# Patient Record
Sex: MALE | Race: WHITE | NOT HISPANIC OR LATINO | URBAN - METROPOLITAN AREA
[De-identification: names, ages, dates, MRNs, and addresses within clinical notes are randomized per-mention and may not be internally consistent; named-entity substitution may affect disease eponyms.]

---

## 2023-09-15 ENCOUNTER — INPATIENT (INPATIENT)
Facility: HOSPITAL | Age: 68
LOS: 1 days | DRG: 871 | End: 2023-09-17
Attending: INTERNAL MEDICINE | Admitting: INTERNAL MEDICINE
Payer: MEDICARE

## 2023-09-15 VITALS
WEIGHT: 244.05 LBS | SYSTOLIC BLOOD PRESSURE: 92 MMHG | HEART RATE: 49 BPM | OXYGEN SATURATION: 99 % | TEMPERATURE: 98 F | DIASTOLIC BLOOD PRESSURE: 48 MMHG | RESPIRATION RATE: 17 BRPM

## 2023-09-15 DIAGNOSIS — R00.1 BRADYCARDIA, UNSPECIFIED: ICD-10-CM

## 2023-09-15 DIAGNOSIS — I50.9 HEART FAILURE, UNSPECIFIED: ICD-10-CM

## 2023-09-15 DIAGNOSIS — E87.5 HYPERKALEMIA: ICD-10-CM

## 2023-09-15 DIAGNOSIS — N17.9 ACUTE KIDNEY FAILURE, UNSPECIFIED: ICD-10-CM

## 2023-09-15 DIAGNOSIS — I95.9 HYPOTENSION, UNSPECIFIED: ICD-10-CM

## 2023-09-15 DIAGNOSIS — Z79.899 OTHER LONG TERM (CURRENT) DRUG THERAPY: ICD-10-CM

## 2023-09-15 PROBLEM — Z00.00 ENCOUNTER FOR PREVENTIVE HEALTH EXAMINATION: Status: ACTIVE | Noted: 2023-09-15

## 2023-09-15 LAB
ALBUMIN SERPL ELPH-MCNC: 2 G/DL — LOW (ref 3.3–5.2)
ALP SERPL-CCNC: 85 U/L — SIGNIFICANT CHANGE UP (ref 40–120)
ALT FLD-CCNC: 33 U/L — SIGNIFICANT CHANGE UP
ANION GAP SERPL CALC-SCNC: 16 MMOL/L — SIGNIFICANT CHANGE UP (ref 5–17)
ANION GAP SERPL CALC-SCNC: 18 MMOL/L — HIGH (ref 5–17)
APPEARANCE UR: CLEAR — SIGNIFICANT CHANGE UP
APTT BLD: 41.1 SEC — HIGH (ref 24.5–35.6)
AST SERPL-CCNC: 87 U/L — HIGH
BACTERIA # UR AUTO: ABNORMAL
BASE EXCESS BLDV CALC-SCNC: -12.3 MMOL/L — LOW (ref -2–3)
BASOPHILS # BLD AUTO: 0.03 K/UL — SIGNIFICANT CHANGE UP (ref 0–0.2)
BASOPHILS NFR BLD AUTO: 0.5 % — SIGNIFICANT CHANGE UP (ref 0–2)
BILIRUB SERPL-MCNC: 3.4 MG/DL — HIGH (ref 0.4–2)
BILIRUB UR-MCNC: ABNORMAL
BUN SERPL-MCNC: 60.6 MG/DL — HIGH (ref 8–20)
BUN SERPL-MCNC: 61.2 MG/DL — HIGH (ref 8–20)
CA-I SERPL-SCNC: 1.02 MMOL/L — LOW (ref 1.15–1.33)
CALCIUM SERPL-MCNC: 8.2 MG/DL — LOW (ref 8.4–10.5)
CALCIUM SERPL-MCNC: 8.3 MG/DL — LOW (ref 8.4–10.5)
CHLORIDE BLDV-SCNC: 105 MMOL/L — SIGNIFICANT CHANGE UP (ref 96–108)
CHLORIDE SERPL-SCNC: 101 MMOL/L — SIGNIFICANT CHANGE UP (ref 96–108)
CHLORIDE SERPL-SCNC: 102 MMOL/L — SIGNIFICANT CHANGE UP (ref 96–108)
CO2 SERPL-SCNC: 13 MMOL/L — LOW (ref 22–29)
CO2 SERPL-SCNC: 14 MMOL/L — LOW (ref 22–29)
COLOR SPEC: YELLOW — SIGNIFICANT CHANGE UP
CREAT SERPL-MCNC: 5.05 MG/DL — HIGH (ref 0.5–1.3)
CREAT SERPL-MCNC: 5.3 MG/DL — HIGH (ref 0.5–1.3)
DIFF PNL FLD: ABNORMAL
EGFR: 11 ML/MIN/1.73M2 — LOW
EGFR: 12 ML/MIN/1.73M2 — LOW
EOSINOPHIL # BLD AUTO: 0.12 K/UL — SIGNIFICANT CHANGE UP (ref 0–0.5)
EOSINOPHIL NFR BLD AUTO: 1.8 % — SIGNIFICANT CHANGE UP (ref 0–6)
EPI CELLS # UR: SIGNIFICANT CHANGE UP
GAS PNL BLDV: 127 MMOL/L — LOW (ref 136–145)
GAS PNL BLDV: SIGNIFICANT CHANGE UP
GLUCOSE BLDV-MCNC: 79 MG/DL — SIGNIFICANT CHANGE UP (ref 70–99)
GLUCOSE SERPL-MCNC: 83 MG/DL — SIGNIFICANT CHANGE UP (ref 70–99)
GLUCOSE SERPL-MCNC: 86 MG/DL — SIGNIFICANT CHANGE UP (ref 70–99)
GLUCOSE UR QL: NEGATIVE — SIGNIFICANT CHANGE UP
HCO3 BLDV-SCNC: 15 MMOL/L — LOW (ref 22–29)
HCT VFR BLD CALC: 27.2 % — LOW (ref 39–50)
HCT VFR BLDA CALC: 28 % — SIGNIFICANT CHANGE UP
HGB BLD CALC-MCNC: 9.2 G/DL — LOW (ref 12.6–17.4)
HGB BLD-MCNC: 10 G/DL — LOW (ref 13–17)
IMM GRANULOCYTES NFR BLD AUTO: 0.5 % — SIGNIFICANT CHANGE UP (ref 0–0.9)
INR BLD: 1.77 RATIO — HIGH (ref 0.85–1.18)
KETONES UR-MCNC: ABNORMAL
LACTATE BLDV-MCNC: 3.1 MMOL/L — HIGH (ref 0.5–2)
LEUKOCYTE ESTERASE UR-ACNC: ABNORMAL
LYMPHOCYTES # BLD AUTO: 0.96 K/UL — LOW (ref 1–3.3)
LYMPHOCYTES # BLD AUTO: 14.7 % — SIGNIFICANT CHANGE UP (ref 13–44)
MAGNESIUM SERPL-MCNC: 2.4 MG/DL — SIGNIFICANT CHANGE UP (ref 1.8–2.6)
MCHC RBC-ENTMCNC: 33.2 PG — SIGNIFICANT CHANGE UP (ref 27–34)
MCHC RBC-ENTMCNC: 36.8 GM/DL — HIGH (ref 32–36)
MCV RBC AUTO: 90.4 FL — SIGNIFICANT CHANGE UP (ref 80–100)
MONOCYTES # BLD AUTO: 0.81 K/UL — SIGNIFICANT CHANGE UP (ref 0–0.9)
MONOCYTES NFR BLD AUTO: 12.4 % — SIGNIFICANT CHANGE UP (ref 2–14)
NEUTROPHILS # BLD AUTO: 4.59 K/UL — SIGNIFICANT CHANGE UP (ref 1.8–7.4)
NEUTROPHILS NFR BLD AUTO: 70.1 % — SIGNIFICANT CHANGE UP (ref 43–77)
NITRITE UR-MCNC: POSITIVE
NT-PROBNP SERPL-SCNC: 4893 PG/ML — HIGH (ref 0–300)
PCO2 BLDV: 33 MMHG — LOW (ref 42–55)
PH BLDV: 7.26 — LOW (ref 7.32–7.43)
PH UR: 5 — SIGNIFICANT CHANGE UP (ref 5–8)
PLATELET # BLD AUTO: 123 K/UL — LOW (ref 150–400)
PO2 BLDV: 110 MMHG — HIGH (ref 25–45)
POTASSIUM BLDV-SCNC: 7.2 MMOL/L — CRITICAL HIGH (ref 3.5–5.1)
POTASSIUM SERPL-MCNC: 6 MMOL/L — HIGH (ref 3.5–5.3)
POTASSIUM SERPL-MCNC: 6.4 MMOL/L — CRITICAL HIGH (ref 3.5–5.3)
POTASSIUM SERPL-SCNC: 6 MMOL/L — HIGH (ref 3.5–5.3)
POTASSIUM SERPL-SCNC: 6.4 MMOL/L — CRITICAL HIGH (ref 3.5–5.3)
PROT SERPL-MCNC: 6.4 G/DL — LOW (ref 6.6–8.7)
PROT UR-MCNC: 100
PROTHROM AB SERPL-ACNC: 19.3 SEC — HIGH (ref 9.5–13)
RBC # BLD: 3.01 M/UL — LOW (ref 4.2–5.8)
RBC # FLD: 18.3 % — HIGH (ref 10.3–14.5)
RBC CASTS # UR COMP ASSIST: ABNORMAL /HPF (ref 0–4)
SAO2 % BLDV: 99.6 % — SIGNIFICANT CHANGE UP
SODIUM SERPL-SCNC: 131 MMOL/L — LOW (ref 135–145)
SODIUM SERPL-SCNC: 133 MMOL/L — LOW (ref 135–145)
SP GR SPEC: 1.02 — SIGNIFICANT CHANGE UP (ref 1.01–1.02)
TROPONIN T SERPL-MCNC: 0.17 NG/ML — HIGH (ref 0–0.06)
UROBILINOGEN FLD QL: 4
WBC # BLD: 6.54 K/UL — SIGNIFICANT CHANGE UP (ref 3.8–10.5)
WBC # FLD AUTO: 6.54 K/UL — SIGNIFICANT CHANGE UP (ref 3.8–10.5)
WBC UR QL: SIGNIFICANT CHANGE UP /HPF (ref 0–5)

## 2023-09-15 PROCEDURE — 71045 X-RAY EXAM CHEST 1 VIEW: CPT | Mod: 26

## 2023-09-15 PROCEDURE — 70450 CT HEAD/BRAIN W/O DYE: CPT | Mod: 26,MG

## 2023-09-15 PROCEDURE — 99223 1ST HOSP IP/OBS HIGH 75: CPT

## 2023-09-15 PROCEDURE — 93010 ELECTROCARDIOGRAM REPORT: CPT

## 2023-09-15 PROCEDURE — 71250 CT THORAX DX C-: CPT | Mod: 26,MG

## 2023-09-15 PROCEDURE — 74176 CT ABD & PELVIS W/O CONTRAST: CPT | Mod: 26,ME

## 2023-09-15 PROCEDURE — 72125 CT NECK SPINE W/O DYE: CPT | Mod: 26,MG

## 2023-09-15 PROCEDURE — 99291 CRITICAL CARE FIRST HOUR: CPT

## 2023-09-15 PROCEDURE — G1004: CPT

## 2023-09-15 RX ORDER — VANCOMYCIN HCL 1 G
1000 VIAL (EA) INTRAVENOUS ONCE
Refills: 0 | Status: COMPLETED | OUTPATIENT
Start: 2023-09-15 | End: 2023-09-15

## 2023-09-15 RX ORDER — SODIUM BICARBONATE 1 MEQ/ML
0.14 SYRINGE (ML) INTRAVENOUS
Qty: 150 | Refills: 0 | Status: DISCONTINUED | OUTPATIENT
Start: 2023-09-15 | End: 2023-09-16

## 2023-09-15 RX ORDER — HEPARIN SODIUM 5000 [USP'U]/ML
5000 INJECTION INTRAVENOUS; SUBCUTANEOUS EVERY 8 HOURS
Refills: 0 | Status: DISCONTINUED | OUTPATIENT
Start: 2023-09-15 | End: 2023-09-16

## 2023-09-15 RX ORDER — CALCIUM GLUCONATE 100 MG/ML
1 VIAL (ML) INTRAVENOUS ONCE
Refills: 0 | Status: COMPLETED | OUTPATIENT
Start: 2023-09-15 | End: 2023-09-15

## 2023-09-15 RX ORDER — NOREPINEPHRINE BITARTRATE/D5W 8 MG/250ML
0.05 PLASTIC BAG, INJECTION (ML) INTRAVENOUS
Qty: 8 | Refills: 0 | Status: DISCONTINUED | OUTPATIENT
Start: 2023-09-15 | End: 2023-09-16

## 2023-09-15 RX ORDER — DEXTROSE 50 % IN WATER 50 %
50 SYRINGE (ML) INTRAVENOUS ONCE
Refills: 0 | Status: COMPLETED | OUTPATIENT
Start: 2023-09-15 | End: 2023-09-15

## 2023-09-15 RX ORDER — DOPAMINE HYDROCHLORIDE 40 MG/ML
2 INJECTION, SOLUTION, CONCENTRATE INTRAVENOUS
Qty: 400 | Refills: 0 | Status: DISCONTINUED | OUTPATIENT
Start: 2023-09-15 | End: 2023-09-16

## 2023-09-15 RX ORDER — EPINEPHRINE 0.3 MG/.3ML
0.03 INJECTION INTRAMUSCULAR; SUBCUTANEOUS
Qty: 4 | Refills: 0 | Status: DISCONTINUED | OUTPATIENT
Start: 2023-09-15 | End: 2023-09-16

## 2023-09-15 RX ORDER — NOREPINEPHRINE BITARTRATE/D5W 8 MG/250ML
0.05 PLASTIC BAG, INJECTION (ML) INTRAVENOUS
Qty: 8 | Refills: 0 | Status: DISCONTINUED | OUTPATIENT
Start: 2023-09-15 | End: 2023-09-15

## 2023-09-15 RX ORDER — PIPERACILLIN AND TAZOBACTAM 4; .5 G/20ML; G/20ML
3.38 INJECTION, POWDER, LYOPHILIZED, FOR SOLUTION INTRAVENOUS ONCE
Refills: 0 | Status: COMPLETED | OUTPATIENT
Start: 2023-09-15 | End: 2023-09-15

## 2023-09-15 RX ORDER — CHLORHEXIDINE GLUCONATE 213 G/1000ML
1 SOLUTION TOPICAL
Refills: 0 | Status: DISCONTINUED | OUTPATIENT
Start: 2023-09-15 | End: 2023-09-17

## 2023-09-15 RX ORDER — GLUCAGON INJECTION, SOLUTION 0.5 MG/.1ML
3 INJECTION, SOLUTION SUBCUTANEOUS ONCE
Refills: 0 | Status: COMPLETED | OUTPATIENT
Start: 2023-09-15 | End: 2023-09-15

## 2023-09-15 RX ORDER — ALBUMIN HUMAN 25 %
50 VIAL (ML) INTRAVENOUS ONCE
Refills: 0 | Status: COMPLETED | OUTPATIENT
Start: 2023-09-15 | End: 2023-09-15

## 2023-09-15 RX ORDER — PANTOPRAZOLE SODIUM 20 MG/1
40 TABLET, DELAYED RELEASE ORAL
Refills: 0 | Status: DISCONTINUED | OUTPATIENT
Start: 2023-09-15 | End: 2023-09-17

## 2023-09-15 RX ORDER — SODIUM CHLORIDE 9 MG/ML
500 INJECTION INTRAMUSCULAR; INTRAVENOUS; SUBCUTANEOUS ONCE
Refills: 0 | Status: COMPLETED | OUTPATIENT
Start: 2023-09-15 | End: 2023-09-15

## 2023-09-15 RX ORDER — SODIUM BICARBONATE 1 MEQ/ML
50 SYRINGE (ML) INTRAVENOUS ONCE
Refills: 0 | Status: COMPLETED | OUTPATIENT
Start: 2023-09-15 | End: 2023-09-15

## 2023-09-15 RX ORDER — ATROPINE SULFATE 0.1 MG/ML
1 SYRINGE (ML) INJECTION ONCE
Refills: 0 | Status: COMPLETED | OUTPATIENT
Start: 2023-09-15 | End: 2023-09-15

## 2023-09-15 RX ORDER — INSULIN HUMAN 100 [IU]/ML
10 INJECTION, SOLUTION SUBCUTANEOUS ONCE
Refills: 0 | Status: COMPLETED | OUTPATIENT
Start: 2023-09-15 | End: 2023-09-15

## 2023-09-15 RX ADMIN — Medication 100 GRAM(S): at 17:24

## 2023-09-15 RX ADMIN — EPINEPHRINE 12.5 MICROGRAM(S)/KG/MIN: 0.3 INJECTION INTRAMUSCULAR; SUBCUTANEOUS at 18:08

## 2023-09-15 RX ADMIN — INSULIN HUMAN 10 UNIT(S): 100 INJECTION, SOLUTION SUBCUTANEOUS at 20:47

## 2023-09-15 RX ADMIN — Medication 1 MILLIGRAM(S): at 18:04

## 2023-09-15 RX ADMIN — DOPAMINE HYDROCHLORIDE 8.3 MICROGRAM(S)/KG/MIN: 40 INJECTION, SOLUTION, CONCENTRATE INTRAVENOUS at 20:58

## 2023-09-15 RX ADMIN — GLUCAGON INJECTION, SOLUTION 3 MILLIGRAM(S): 0.5 INJECTION, SOLUTION SUBCUTANEOUS at 18:06

## 2023-09-15 RX ADMIN — Medication 50 MILLIEQUIVALENT(S): at 20:41

## 2023-09-15 RX ADMIN — Medication 250 MILLIGRAM(S): at 20:49

## 2023-09-15 RX ADMIN — SODIUM CHLORIDE 500 MILLILITER(S): 9 INJECTION INTRAMUSCULAR; INTRAVENOUS; SUBCUTANEOUS at 17:18

## 2023-09-15 RX ADMIN — PIPERACILLIN AND TAZOBACTAM 200 GRAM(S): 4; .5 INJECTION, POWDER, LYOPHILIZED, FOR SOLUTION INTRAVENOUS at 18:16

## 2023-09-15 RX ADMIN — Medication 50 MILLILITER(S): at 20:41

## 2023-09-15 NOTE — ED PROVIDER NOTE - CRITICAL CARE ATTENDING CONTRIBUTION TO CARE
68M presenting from [facility] initially in triage with stable vitals, though on bedside eval found to have minimal responsiveness with hypotension, bradycardia; required multiple consultations, multiple drips / emergent medicines, multiple family discussions and repeat evaluations 68M presenting from [facility] initially in triage with stable vitals, though on bedside eval found to have minimal responsiveness with hypotension, bradycardia; required multiple consultations, multiple drips / emergent medicines, multiple family discussions and repeat evaluations 68M presenting from [facility] initially in triage with stable vitals, though on bedside eval found to have minimal responsiveness with hypotension, bradycardia; required multiple consultations, multiple drips / emergent medicines, multiple family discussions and repeat evaluations

## 2023-09-15 NOTE — CONSULT NOTE ADULT - ASSESSMENT
69yo M w/ HTN, Hypothyroidism, Alcoholic cirrhosis, CKD stage V admitted after acute mental status changes, worsening renal failure, sinus bradycardia with prolonged QTc interval and low voltage on his EKG (on Metoprolol 100mg daily in NH).  Etiology likely due to hyperkalemia, anion gap metabolic acidosis and sepsis (Lactate 3.1).  Also patient found to be hypotensive with systolic blood pressure in the 60s started on Levophed and Dopamine gtts admitted to ICU.  Cardiology called for fluid overload likely due to underlying cardiomyopathy.  Patient w/ MOLST from NH.    67yo M w/ HTN, Hypothyroidism, Alcoholic cirrhosis, CKD stage V admitted after acute mental status changes, worsening renal failure, sinus bradycardia with prolonged QTc interval and low voltage on his EKG (on Metoprolol 100mg daily in NH).  Etiology likely due to hyperkalemia, anion gap metabolic acidosis and sepsis (Lactate 3.1).  Also patient found to be hypotensive with systolic blood pressure in the 60s started on Levophed and Dopamine gtts admitted to ICU.  Cardiology called for fluid overload likely due to underlying cardiomyopathy.  Patient w/ [facility] from NH.    67yo M w/ HTN, Hypothyroidism, Alcoholic cirrhosis, CKD stage V admitted after acute mental status changes, worsening renal failure, sinus bradycardia with prolonged QTc interval and low voltage on his EKG (on Metoprolol 100mg daily in NH).  Etiology likely due to hyperkalemia, anion gap metabolic acidosis and sepsis (Lactate 3.1).  Also patient found to be hypotensive with systolic blood pressure in the 60s started on Levophed and Dopamine gtts admitted to ICU.  Cardiology called for fluid overload, likely due to underlying cardiomyopathy.  Patient w/ [facility]ST from NH.

## 2023-09-15 NOTE — CONSULT NOTE ADULT - SUBJECTIVE AND OBJECTIVE BOX
CARDIAC ELECTROPHYSIOLOGY  [facility]   Office: [address] [address]  Telephone: [phone] Fax:[phone]      68 year old male nursing home resident h/o HTN, hypothyroid, alcoholic cirrhosis and CKD, p/w AMS and KIM. Ability to obtain history is limited by pt's poor mental status/somnolence. Per report, pt fell yesterday at NH and subsequently was noted to have increasing creatinine. NH was concerned for rhabdomylysis, prompting transport to [facility]. On arrival, pt is noted to be hypothermic, acidotic, and likely hyperkalemic (6 on BMP but sample hemolyzed w/ repeat pending; 7.2on VBG), with Cr 5.05, and sinus bradycardia to 40s bpm. EP is consulted for sinus bradycardia.     Pt is unable to participate in ROS due to lethargy. Pt is noted to be on metoprolol succinate 100mg daily.     PAST MEDICAL & SURGICAL HISTORY:  HTN  Alcoholic Cirrhosis  CKD  Gout    REVIEW OF SYSTEMS: unable to obtain due to AMS    Home medications:   Allopurinol 300mg daily  Folic Acid  Levothyroxine 88mcg daily  metoprolol succinate 100mg daily   Thiamine      MEDICATIONS  (STANDING):  norepinephrine Infusion 0.05 MICROgram(s)/kG/Min (10.4 mL/Hr) IV Continuous <Continuous>  piperacillin/tazobactam IVPB... 3.375 Gram(s) IV Intermittent once      Allergies  doxycycline (Other (Mild to Mod))    SOCIAL HISTORY: unable to obtain due to AMS    FAMILY HISTORY: unable to obtain due to AMS      Vital Signs Last 24 Hrs  T(C): 32.7 (15 Sep 2023 16:30), Max: 36.7 (15 Sep 2023 13:46)  T(F): 90.9 (15 Sep 2023 16:30), Max: 98 (15 Sep 2023 13:46)  HR: 42 (15 Sep 2023 16:30) (42 - 49)  BP: 89/42 (15 Sep 2023 16:30) (89/42 - 92/48)  RR: 20 (15 Sep 2023 16:30) (17 - 20)  SpO2: 100% (15 Sep 2023 16:30) (99% - 100%)    Parameters below as of 15 Sep 2023 13:46  Patient On (Oxygen Delivery Method): room air    Physical Exam:  Constitutional: lethargic, minimally arousable, NAD  Neck: supple  Cardiovascular: +S1S2 bradycardic, regular  Pulmonary: course breath sounds (anterior exam only)  Abdomen: +BS, softly distended  Extremities: distal upper extremities cool w/ nonpitting edema; LE wrapped b/l  Neuro: lethargic    LABS:  09-15    131<L>  |  101  |  60.6<H>  ----------------------------<  86  6.0<H>   |  14.0<L>  |  5.05<H>    Ca    8.2<L>      15 Sep 2023 16:25  Mg     2.4     09-15    TPro  6.4<L>  /  Alb  2.0<L>  /  TBili  3.4<H>  /  DBili  x   /  AST  87<H>  /  ALT  33  /  AlkPhos  85  09-15  LIVER FUNCTIONS - ( 15 Sep 2023 16:25 )  Alb: 2.0 g/dL / Pro: 6.4 g/dL / ALK PHOS: 85 U/L / ALT: 33 U/L / AST: 87 U/L / GGT: x           PT/INR - ( 15 Sep 2023 16:25 )   PT: 19.3 sec;   INR: 1.77 ratio         PTT - ( 15 Sep 2023 16:25 )  PTT:41.1 secCARDIAC MARKERS ( 15 Sep 2023 16:25 )  x     / 0.17 ng/mL / x     / x     / x          RADIOLOGY & ADDITIONAL STUDIES:  CXR, echo pending   CARDIAC ELECTROPHYSIOLOGY  [facility]   Office: [address] [address]  Telephone: [phone] Fax:[phone]      68 year old male nursing home resident h/o HTN, hypothyroid, alcoholic cirrhosis and CKD, p/w AMS and KIM. Ability to obtain history is limited by pt's poor mental status/somnolence. Per report, pt fell yesterday at NH and subsequently was noted to have increasing creatinine. NH was concerned for rhabdomylysis, prompting transport to [facility]. On arrival, pt is noted to be hypothermic, acidotic, and likely hyperkalemic (6 on BMP but sample hemolyzed w/ repeat pending; 7.2on VBG), with Cr 5.05, and sinus bradycardia to 40s bpm. EP is consulted for sinus bradycardia.     Pt is unable to participate in ROS due to lethargy. Pt is noted to be on metoprolol succinate 100mg daily.     PAST MEDICAL & SURGICAL HISTORY:  HTN  Alcoholic Cirrhosis  CKD  Gout    REVIEW OF SYSTEMS: unable to obtain due to AMS    Home medications:   Allopurinol 300mg daily  Folic Acid  Levothyroxine 88mcg daily  metoprolol succinate 100mg daily   Thiamine      MEDICATIONS  (STANDING):  norepinephrine Infusion 0.05 MICROgram(s)/kG/Min (10.4 mL/Hr) IV Continuous <Continuous>  piperacillin/tazobactam IVPB... 3.375 Gram(s) IV Intermittent once      Allergies  doxycycline (Other (Mild to Mod))    SOCIAL HISTORY: unable to obtain due to AMS    FAMILY HISTORY: unable to obtain due to AMS      Vital Signs Last 24 Hrs  T(C): 32.7 (15 Sep 2023 16:30), Max: 36.7 (15 Sep 2023 13:46)  T(F): 90.9 (15 Sep 2023 16:30), Max: 98 (15 Sep 2023 13:46)  HR: 42 (15 Sep 2023 16:30) (42 - 49)  BP: 89/42 (15 Sep 2023 16:30) (89/42 - 92/48)  RR: 20 (15 Sep 2023 16:30) (17 - 20)  SpO2: 100% (15 Sep 2023 16:30) (99% - 100%)    Parameters below as of 15 Sep 2023 13:46  Patient On (Oxygen Delivery Method): room air    Physical Exam:  Constitutional: lethargic, minimally arousable, NAD  Neck: supple  Cardiovascular: +S1S2 bradycardic, regular  Pulmonary: course breath sounds (anterior exam only)  Abdomen: +BS, softly distended  Extremities: distal upper extremities cool w/ nonpitting edema; LE wrapped b/l  Neuro: lethargic    LABS:  09-15    131<L>  |  101  |  60.6<H>  ----------------------------<  86  6.0<H>   |  14.0<L>  |  5.05<H>    Ca    8.2<L>      15 Sep 2023 16:25  Mg     2.4     09-15    TPro  6.4<L>  /  Alb  2.0<L>  /  TBili  3.4<H>  /  DBili  x   /  AST  87<H>  /  ALT  33  /  AlkPhos  85  09-15  LIVER FUNCTIONS - ( 15 Sep 2023 16:25 )  Alb: 2.0 g/dL / Pro: 6.4 g/dL / ALK PHOS: 85 U/L / ALT: 33 U/L / AST: 87 U/L / GGT: x           PT/INR - ( 15 Sep 2023 16:25 )   PT: 19.3 sec;   INR: 1.77 ratio         PTT - ( 15 Sep 2023 16:25 )  PTT:41.1 secCARDIAC MARKERS ( 15 Sep 2023 16:25 )  x     / 0.17 ng/mL / x     / x     / x          RADIOLOGY & ADDITIONAL STUDIES:  CXR, echo pending   CARDIAC ELECTROPHYSIOLOGY  [facility]   Office: [address] [address]  Telephone: [phone] Fax:[phone]      68 year old male nursing home resident h/o HTN, hypothyroid, alcoholic cirrhosis and CKD, p/w AMS and KIM. Ability to obtain history is limited by pt's poor mental status/somnolence. Per report, pt fell yesterday at NH and subsequently was noted to have increasing creatinine. NH was concerned for rhabdomylysis, prompting transport to [facility]. On arrival, pt is noted to be hypothermic, acidotic, and likely hyperkalemic (6 on BMP but sample hemolyzed w/ repeat pending; 7.2on VBG), with Cr 5.05, and sinus bradycardia to 40s bpm. EP is consulted for sinus bradycardia.     Pt is unable to participate in ROS due to lethargy. Pt is noted to be on metoprolol succinate 100mg daily.     PAST MEDICAL & SURGICAL HISTORY:  HTN  Alcoholic Cirrhosis  CKD  Gout    REVIEW OF SYSTEMS: unable to obtain due to AMS    Home medications:   Allopurinol 300mg daily  Folic Acid  Levothyroxine 88mcg daily  metoprolol succinate 100mg daily   Thiamine      MEDICATIONS  (STANDING):  norepinephrine Infusion 0.05 MICROgram(s)/kG/Min (10.4 mL/Hr) IV Continuous <Continuous>  piperacillin/tazobactam IVPB... 3.375 Gram(s) IV Intermittent once      Allergies  doxycycline (Other (Mild to Mod))    SOCIAL HISTORY: unable to obtain due to AMS    FAMILY HISTORY: unable to obtain due to AMS      Vital Signs Last 24 Hrs  T(C): 32.7 (15 Sep 2023 16:30), Max: 36.7 (15 Sep 2023 13:46)  T(F): 90.9 (15 Sep 2023 16:30), Max: 98 (15 Sep 2023 13:46)  HR: 42 (15 Sep 2023 16:30) (42 - 49)  BP: 89/42 (15 Sep 2023 16:30) (89/42 - 92/48)  RR: 20 (15 Sep 2023 16:30) (17 - 20)  SpO2: 100% (15 Sep 2023 16:30) (99% - 100%)    Parameters below as of 15 Sep 2023 13:46  Patient On (Oxygen Delivery Method): room air    Physical Exam:  Constitutional: lethargic, minimally arousable, NAD  Neck: supple  Cardiovascular: +S1S2 bradycardic, regular  Pulmonary: course breath sounds (anterior exam only)  Abdomen: +BS, softly distended  Extremities: distal upper extremities cool w/ nonpitting edema; LE wrapped b/l  Neuro: lethargic    LABS:  09-15    131<L>  |  101  |  60.6<H>  ----------------------------<  86  6.0<H>   |  14.0<L>  |  5.05<H>    Ca    8.2<L>      15 Sep 2023 16:25  Mg     2.4     09-15    TPro  6.4<L>  /  Alb  2.0<L>  /  TBili  3.4<H>  /  DBili  x   /  AST  87<H>  /  ALT  33  /  AlkPhos  85  09-15  LIVER FUNCTIONS - ( 15 Sep 2023 16:25 )  Alb: 2.0 g/dL / Pro: 6.4 g/dL / ALK PHOS: 85 U/L / ALT: 33 U/L / AST: 87 U/L / GGT: x           PT/INR - ( 15 Sep 2023 16:25 )   PT: 19.3 sec;   INR: 1.77 ratio         PTT - ( 15 Sep 2023 16:25 )  PTT:41.1 secCARDIAC MARKERS ( 15 Sep 2023 16:25 )  x     / 0.17 ng/mL / x     / x     / x          RADIOLOGY & ADDITIONAL STUDIES:  CXR, echo pending

## 2023-09-15 NOTE — CONSULT NOTE ADULT - PROBLEM SELECTOR RECOMMENDATION 9
Admit to ICU in light of hypotension, follow daily CBC, BMP, Trend Ce x3, pro-BNP 4800,   - hold diuretics since patient hypotensive on admission, now on Levophed and Dopamine gtts  - Strict I/O and daily standing weights (if possible), low Na diet  - trend daily BMP, keep K>4, Mg>2    - monitor daily renal function   - hold Metoprolol  - CXR mild edema/congestion  - TTE Pen  - on low dose inotropic support currently

## 2023-09-15 NOTE — ED PROVIDER NOTE - RE-EVALUATION DATE/TIME:
15-Sep-2023 20:46 Cibinqo Pregnancy And Lactation Text: It is unknown if this medication will adversely affect pregnancy or breast feeding.  You should not take this medication if you are currently pregnant or planning a pregnancy or while breastfeeding.

## 2023-09-15 NOTE — CONSULT NOTE ADULT - NS ATTEND AMEND GEN_ALL_CORE FT
.  .  This is a 68-year-old male with hypertension, hypothyroidism, alcoholic cirrhosis, and chronic kidney disease who was found to have altered mental status and worsening renal function at his nursing home for which she was sent to [facility].  History is limited as the patient has altered mental status and nursing home records are sparse.  Upon arrival he was noted to have sinus bradycardia with prolonged QTc interval and low voltage on his EKG.  He was also hypotensive with systolic blood pressure in the 50s.  Blood work reveals acute kidney injury, hyperkalemia, anion gap metabolic acidosis with a VBG pH is 7.2 and blood lactate level of 3.1.  At the nursing home he is on metoprolol 100 mg daily as well as Fleet enema.    His bradycardia is most likely due to metabolic derangements including hyperkalemia and acidosis.  Additionally, he is on metoprolol which is likely building up in his system due to decreased clearance from acute kidney injury.  I would recommend beta-blocker reversal with glucagon and atropine.  I would support his blood pressure with vasopressor that also have chrootropy such as epinephrine and dopamine. Please obtain echocardiogram given low voltage on ECG.    What is unclear is the etiology of his KIM, but he is on free enema at home which in rare instances can cause hyperphosphatemia (Phos level pending) which can precipitate renal failure.  Alternatively, he does have alcoholic cirrhosis so hepatorenal syndrome is on the differential.  I would recommend obtaining nephrology consultation.    Discussed with Dr. Penaloza.    Thank you for this consult. We will follow with you.    Ranjith Cabrera MD  Clinical Cardiac Electrophysiology .  .  This is a 68-year-old male with hypertension, hypothyroidism, alcoholic cirrhosis, and chronic kidney disease who was found to have altered mental status and worsening renal function at his nursing home for which she was sent to [facility].  History is limited as the patient has altered mental status and nursing home records are sparse.  Upon arrival he was noted to have sinus bradycardia with prolonged QTc interval and low voltage on his EKG.  He was also hypotensive with systolic blood pressure in the 50s.  Blood work reveals acute kidney injury, hyperkalemia, anion gap metabolic acidosis with a VBG pH is 7.2 and blood lactate level of 3.1.  At the nursing home he is on metoprolol 100 mg daily as well as Fleet enema.    His bradycardia is most likely due to metabolic derangements including hyperkalemia and acidosis.  Additionally, he is on metoprolol which is likely building up in his system due to decreased clearance from acute kidney injury.  I would recommend beta-blocker reversal with glucagon and atropine.  I would support his blood pressure with vasopressor that also have chrootropy such as epinephrine and dopamine. Please obtain echocardiogram given low voltage on ECG.    What is unclear is the etiology of his KIM, but he is on free enema at home which in rare instances can cause hyperphosphatemia (Phos level pending) which can precipitate renal failure.  Alternatively, he does have alcoholic cirrhosis so hepatorenal syndrome is on the differential.  I would recommend obtaining nephrology consultation.    Discussed with Dr. Penaloza.    Thank you for this consult. We will follow with you.    Ranjith Cabrera MD  Clinical Cardiac Electrophysiology .  .  This is a 68-year-old male with hypertension, hypothyroidism, alcoholic cirrhosis, and chronic kidney disease who was found to have altered mental status and worsening renal function at his nursing home for which she was sent to [facility].  History is limited as the patient has altered mental status and nursing home records are sparse.  Upon arrival he was noted to have sinus bradycardia with prolonged QTc interval and low voltage on his EKG.  He was also hypotensive with systolic blood pressure in the 50s.  Blood work reveals acute kidney injury, hyperkalemia, anion gap metabolic acidosis with a VBG pH is 7.2 and blood lactate level of 3.1.  At the nursing home he is on metoprolol 100 mg daily as well as Fleet enema.    His bradycardia is most likely due to metabolic derangements including hyperkalemia and acidosis.  Additionally, he is on metoprolol which is likely building up in his system due to decreased clearance from acute kidney injury.  I would recommend beta-blocker reversal with glucagon and atropine.  I would support his blood pressure with vasopressor that also have chrootropy such as epinephrine and dopamine. Please obtain echocardiogram given low voltage on ECG.    What is unclear is the etiology of his KIM, but he is on free enema at home which in rare instances can cause hyperphosphatemia (Phos level pending) which can precipitate renal failure.  Alternatively, he does have alcoholic cirrhosis so hepatorenal syndrome is on the differential.  I would recommend obtaining nephrology consultation.    Discussed with Dr. Penaloza.    Thank you for this consult. We will follow with you.    Ranjith Cabrera MD  Clinical Cardiac Electrophysiology

## 2023-09-15 NOTE — ED ADULT TRIAGE NOTE - GLASGOW COMA SCALE: BEST VERBAL RESPONSE, MLM
As a referral from the nurse navigator at the Cancer center, this  called Lori to let her know of our services.  No one answered the phone and a message was left on her answering machine to let her  know that she is supported with  spiritual care.  Our phone number is given if the patient would want further support.    (V5) oriented

## 2023-09-15 NOTE — ED ADULT NURSE NOTE - CHIEF COMPLAINT QUOTE
pt biba from, [facility] for elevated BUN, pts BUN was 4.27 on 9/15, pt also c/o abdominal pain, pt appears to be @ baseline per ems. pt biba from, [facility] for elevated BUN, pts BUN was 4.27 on 9/15, pt also c/o abdominal pain, pt appears to be @ baseline per ems. pt biba from, [facility] for elevated BUN, pts BUN was 4.27 on 9/15, pt also c/o abdominal pain, pt appears to be @ baseline per ems.

## 2023-09-15 NOTE — H&P ADULT - HISTORY OF PRESENT ILLNESS
Pt is a 67 y/o M pmhx of HTN, hypothyroid, ETOH cirrhosis and CKD who presents from nursing home for AMS and s/p fall. Pt was transported to [facility] by EMS for concern for rhabdomyolysis, in ED pt found to be hypothermic, acidotic and hyperkalemic, w/ serum Cr of 5.05. Pt found to be in sinus bradycardia w/ HR in the 40's, concern for junctional rhythm, nephrology and MICU consulted for further management Pt is a 67 y/o M pmhx of HTN, hypothyroid, ETOH cirrhosis and CKD who presents from nursing home for AMS and s/p fall. Pt was transported to [facility] by EMS for concern for rhabdomyolysis, in ED pt found to be hypothermic, acidotic and hyperkalemic, w/ serum Cr of 5.05. Pt found to be in sinus bradycardia w/ HR in the 40's, concern for junctional rhythm, nephrology and MICU consulted for further management Pt is a 67 y/o M pmhx of HTN, hypothyroid, ETOH cirrhosis and CKD who presents from nursing home for AMS and s/p fall. Pt was transported to [facility] by EMS for concern for rhabdomyolysis, in ED pt found to be hypothermic, acidotic and hyperkalemic, w/ serum Cr of 5.05. Pt found to be in sinus bradycardia w/ HR in the 40's, concern for junctional rhythm, nephrology and MICU consulted for further management

## 2023-09-15 NOTE — ED ADULT TRIAGE NOTE - CHIEF COMPLAINT QUOTE
pt biba from, [facility] for elevated BUN, pts BUN was 4.27 on 9/15, pt also c/o abdominal pain, pt appears to be @ baseline per ems. pt biba from, [facility] for elevated BUN, pts BUN was 4.27 on 9/15, pt also c/o abdominal pain, pt appears to be @ baseline per ems. pt biba from, [facility] for elevated BUN, pts BUN was 4.27 on 9/15, pt also c/o abdominal pain, pt appears to be @ baseline per ems.

## 2023-09-15 NOTE — H&P ADULT - NS PANP COMMENT GEN_ALL_CORE FT
68M with hx of EtOH related cirrhosis, HTN, hypothyroidism, CKD referred to the ED secondary to acute on chronic renal failure noted on labs/ AMS and was being treated for possible UTI and reportedly had fall yesterday. He was found to be hypothermic, bradycardiac on presentation with Cr 5, hyperkalemia to 6 with AGMA and lactate 3. Pt was given atropine in the ED as well as glucagon given metoprolol use outpatient and reported junctional rhythm but was sinus santo when seen. Pt was confused on exam and intermittently agitated pulling at lines. Pt was given albumin and 500cc bolus with minimal UOP and started on epi/dopa for hypotension/bradycardia. CT head without acute pathology. CT cervical spine without acute fractures/dislocations. CT chest/abd/pelvis showed pulm edema, 3mm non-obs R kidney stone. Pt with minimal response to fluids so was started on CVVHD per nephro recs and started on bicarb drip. Pt was transitioned to levo/vaso and dopa was weaned down. Pt maintaining NSR. Echo pending. Ammonia elevated to 119 but pt too lethargic to tolerate PO. NG tube was attempted twice but pt did not tolerate with bleeding from nares and significant coughing so will do rectal lactulose in the interim. Hgb downtrended slightly but no active bleeding noted; will trend CBC. Mendez placed by urology overnight. Will trend labs on CVVHD and attempt to remove fluid as hemodynamics allow. Will continue on empiric abx pending cultures. DNR/DNI per MOLST from nursing home.

## 2023-09-15 NOTE — CONSULT NOTE ADULT - SUBJECTIVE AND OBJECTIVE BOX
[facility]                                              CARDIOLOGY AT [place] [address]                                             Telephone: [phone]. Fax:[phone]                                                       CARDIOLOGY CONSULTATION NOTE                                                                                             History obtained by: Patient and medical record  Community Cardiologist: Unknown   obtained: Yes [  ] No [  ]  Reason for Consultation: fluid overload  Available out pt records reviewed: Yes [x] No [  ]    Chief complaint:  AMS     HPI: Patient is a 67yo nursing home resident PMHx of HTN, Hypothyroid, Alcoholic cirrhosis and CKD, admitted after developing AMS and KIM.  History is limited by patient's poor mental status/somnolence.  Per transfer documents patient fell yesterday at NH and subsequently was noted to have increasing creatinine.  NH was concerned for rhabdomylysis, prompting transport to [facility] this afternoon.  On arrival, he was noted to be hypothermic, acidotic and likely hyperkalemic (K: 6.4), with Cr 5.05, and sinus bradycardia to 40s BPM, patient seen by EP team earlier for sinus bradycardia.  Cardiology consult requested for fluid overload.  Patient otherwise unable to respond to ROS due to lethargy.  CEx1: 0.17  po-BNP: 4800  ECG: Sinus santo  Currently on Dopamine & Levophed gtts  HR: 60s       CARDIAC TESTING   ECHO: Pen  STRESS:  CATH:   ELECTROPHYSIOLOGY:     PAST MEDICAL HISTORY  HTN  Alcoholic Cirrhosis  CKD  Gout    PAST SURGICAL HISTORY    SOCIAL HISTORY:  Denies smoking/alcohol/drugs    FAMILY HISTORY:    Family History of Cardiovascular Disease:  Yes [  ] No [  ]  Coronary Artery Disease in first degree relative: Yes [  ] No [  ]  Sudden Cardiac Death in First degree relative: Yes [  ] No [  ]    HOME MEDICATIONS:  Allopurinol 300mg daily  Folic Acid  Levothyroxine 88mcg daily  metoprolol succinate 100mg daily   Thiamine    CURRENT CARDIAC MEDICATIONS:  DOPamine Infusion 2 MICROgram(s)/kG/Min IV Continuous <Continuous>  EPINEPHrine    Infusion 0.03 MICROgram(s)/kG/Min IV Continuous <Continuous>    CURRENT OTHER MEDICATIONS:  albumin human 25% IVPB 50 milliLiter(s) IV Intermittent Once, Stop order after: 1 Doses  sodium bicarbonate  Infusion 0.136 mEq/kG/Hr (100 mL/Hr) IV Continuous <Continuous>    ALLERGIES: doxycycline (Other (Mild to Mod))    REVIEW OF SYMPTOMS:   CONSTITUTIONAL: Lethargic  ALL OTHER REVIEW OF SYSTEMS ARE NEGATIVE.    VITAL SIGNS:  T(C): 33.3 (09-15-23 @ 19:35), Max: 36.7 (09-15-23 @ 13:46)  T(F): 92 (09-15-23 @ 19:35), Max: 98 (09-15-23 @ 13:46)  HR: 68 (09-15-23 @ 19:35) (42 - 68)  BP: 103/53 (09-15-23 @ 19:35) (89/42 - 105/51)  RR: 20 (09-15-23 @ 19:35) (17 - 20)  SpO2: 100% (09-15-23 @ 19:35) (99% - 100%)    INTAKE AND OUTPUT:    PHYSICAL EXAM:  Constitutional: Lethargy, NAD   HEENT: Atraumatic, neck is supple  CNS: lethargic   Respiratory: fine crackles at the back, no wheezing   Cardiovascular: S1S2, no murmur  Gastrointestinal: Soft, non-tender  Extremities: distal upper extremities cool to touch, non pitting LE edema; LE wrapped   Psychiatric: Calm   Skin: cool to touch    LABS:  ( 15 Sep 2023 17:31 )  Troponin T  X    ,  CPK  195  , CKMB  X    , BNP X        , ( 15 Sep 2023 16:25 )  Troponin T  0.17<H>,  CPK  X    , CKMB  X    , BNP X                           10.0   6.54  )-----------( 123      ( 15 Sep 2023 16:25 )             27.2     09-15    133<L>  |  102  |  61.2<H>  ----------------------------<  83  6.4<HH>   |  13.0<L>  |  5.30<H>    Ca    8.3<L>      15 Sep 2023 17:31  Phos  6.4     09-15  Mg     2.4     09-15    TPro  6.4<L>  /  Alb  2.0<L>  /  TBili  3.4<H>  /  DBili  x   /  AST  87<H>  /  ALT  33  /  AlkPhos  85  09-15    PT/INR - ( 15 Sep 2023 16:25 )   PT: 19.3 sec;   INR: 1.77 ratio      PTT - ( 15 Sep 2023 16:25 )  PTT:41.1 sec    Urinalysis Basic - ( 15 Sep 2023 17:31 )  Color: x / Appearance: x / SG: x / pH: x  Gluc: 83 mg/dL / Ketone: x  / Bili: x / Urobili: x   Blood: x / Protein: x / Nitrite: x   Leuk Esterase: x / RBC: x / WBC x   Sq Epi: x / Non Sq Epi: x / Bacteria: x    Thyroid Stimulating Hormone, Serum: 8.37 uIU/mL (09-15-23 @ 17:31)    INTERPRETATION OF TELEMETRY: Sinus rhythm    ECG: earlier Sinus santo   Prior ECG: Yes [  ] No [x]    RADIOLOGY & ADDITIONAL STUDIES:    X-ray:    CT Abdomen and Pelvis scan: IMPRESSION:  1.  Mild pulmonary edema. Bilateral pleural effusions.  2.  No hydronephrosis. 3 mm nonobstructive stone of the right kidney.  3.  Severe, diffuse subcutaneous edema.                                              [facility]                                              CARDIOLOGY AT [place] [address]                                             Telephone: [phone]. Fax:[phone]                                                       CARDIOLOGY CONSULTATION NOTE                                                                                             History obtained by: Patient and medical record  Community Cardiologist: Unknown   obtained: Yes [  ] No [  ]  Reason for Consultation: fluid overload  Available out pt records reviewed: Yes [x] No [  ]    Chief complaint:  AMS     HPI: Patient is a 67yo nursing home resident PMHx of HTN, Hypothyroid, Alcoholic cirrhosis and CKD, admitted after developing AMS and KIM.  History is limited by patient's poor mental status/somnolence.  Per transfer documents patient fell yesterday at NH and subsequently was noted to have increasing creatinine.  NH was concerned for rhabdomylysis, prompting transport to [facility] this afternoon.  On arrival, he was noted to be hypothermic, acidotic and likely hyperkalemic (K: 6.4), with Cr 5.05, and sinus bradycardia to 40s BPM, patient seen by EP team earlier for sinus bradycardia.  Cardiology consult requested for fluid overload.  Patient otherwise unable to respond to ROS due to lethargy.  CEx1: 0.17  po-BNP: 4800  ECG: Sinus santo  Currently on Dopamine & Levophed gtts  HR: 60s       CARDIAC TESTING   ECHO: Pen  STRESS:  CATH:   ELECTROPHYSIOLOGY:     PAST MEDICAL HISTORY  HTN  Alcoholic Cirrhosis  CKD  Gout    PAST SURGICAL HISTORY    SOCIAL HISTORY:  Denies smoking/alcohol/drugs    FAMILY HISTORY:    Family History of Cardiovascular Disease:  Yes [  ] No [  ]  Coronary Artery Disease in first degree relative: Yes [  ] No [  ]  Sudden Cardiac Death in First degree relative: Yes [  ] No [  ]    HOME MEDICATIONS:  Allopurinol 300mg daily  Folic Acid  Levothyroxine 88mcg daily  metoprolol succinate 100mg daily   Thiamine    CURRENT CARDIAC MEDICATIONS:  DOPamine Infusion 2 MICROgram(s)/kG/Min IV Continuous <Continuous>  EPINEPHrine    Infusion 0.03 MICROgram(s)/kG/Min IV Continuous <Continuous>    CURRENT OTHER MEDICATIONS:  albumin human 25% IVPB 50 milliLiter(s) IV Intermittent Once, Stop order after: 1 Doses  sodium bicarbonate  Infusion 0.136 mEq/kG/Hr (100 mL/Hr) IV Continuous <Continuous>    ALLERGIES: doxycycline (Other (Mild to Mod))    REVIEW OF SYMPTOMS:   CONSTITUTIONAL: Lethargic  ALL OTHER REVIEW OF SYSTEMS ARE NEGATIVE.    VITAL SIGNS:  T(C): 33.3 (09-15-23 @ 19:35), Max: 36.7 (09-15-23 @ 13:46)  T(F): 92 (09-15-23 @ 19:35), Max: 98 (09-15-23 @ 13:46)  HR: 68 (09-15-23 @ 19:35) (42 - 68)  BP: 103/53 (09-15-23 @ 19:35) (89/42 - 105/51)  RR: 20 (09-15-23 @ 19:35) (17 - 20)  SpO2: 100% (09-15-23 @ 19:35) (99% - 100%)    INTAKE AND OUTPUT:    PHYSICAL EXAM:  Constitutional: Lethargy, NAD   HEENT: Atraumatic, neck is supple  CNS: lethargic   Respiratory: fine crackles at the back, no wheezing   Cardiovascular: S1S2, no murmur  Gastrointestinal: Soft, non-tender  Extremities: distal upper extremities cool to touch, non pitting LE edema; LE wrapped   Psychiatric: Calm   Skin: cool to touch    LABS:  ( 15 Sep 2023 17:31 )  Troponin T  X    ,  CPK  195  , CKMB  X    , BNP X        , ( 15 Sep 2023 16:25 )  Troponin T  0.17<H>,  CPK  X    , CKMB  X    , BNP X                           10.0   6.54  )-----------( 123      ( 15 Sep 2023 16:25 )             27.2     09-15    133<L>  |  102  |  61.2<H>  ----------------------------<  83  6.4<HH>   |  13.0<L>  |  5.30<H>    Ca    8.3<L>      15 Sep 2023 17:31  Phos  6.4     09-15  Mg     2.4     09-15    TPro  6.4<L>  /  Alb  2.0<L>  /  TBili  3.4<H>  /  DBili  x   /  AST  87<H>  /  ALT  33  /  AlkPhos  85  09-15    PT/INR - ( 15 Sep 2023 16:25 )   PT: 19.3 sec;   INR: 1.77 ratio      PTT - ( 15 Sep 2023 16:25 )  PTT:41.1 sec    Urinalysis Basic - ( 15 Sep 2023 17:31 )  Color: x / Appearance: x / SG: x / pH: x  Gluc: 83 mg/dL / Ketone: x  / Bili: x / Urobili: x   Blood: x / Protein: x / Nitrite: x   Leuk Esterase: x / RBC: x / WBC x   Sq Epi: x / Non Sq Epi: x / Bacteria: x    Thyroid Stimulating Hormone, Serum: 8.37 uIU/mL (09-15-23 @ 17:31)    INTERPRETATION OF TELEMETRY: Sinus rhythm    ECG: earlier Sinus santo   Prior ECG: Yes [  ] No [x]    RADIOLOGY & ADDITIONAL STUDIES:    X-ray:    CT Abdomen and Pelvis scan: IMPRESSION:  1.  Mild pulmonary edema. Bilateral pleural effusions.  2.  No hydronephrosis. 3 mm nonobstructive stone of the right kidney.  3.  Severe, diffuse subcutaneous edema.                                              [facility]                                              CARDIOLOGY AT [place] [address]                                             Telephone: [phone]. Fax:[phone]                                                       CARDIOLOGY CONSULTATION NOTE                                                                                             History obtained by: Patient and medical record  Community Cardiologist: Unknown   obtained: Yes [  ] No [  ]  Reason for Consultation: fluid overload  Available out pt records reviewed: Yes [x] No [  ]    Chief complaint:  AMS     HPI: Patient is a 67yo nursing home resident PMHx of HTN, Hypothyroid, Alcoholic cirrhosis and CKD, admitted after developing AMS and KIM.  History is limited by patient's poor mental status/somnolence.  Per transfer documents patient fell yesterday at NH and subsequently was noted to have increasing creatinine.  NH was concerned for rhabdomylysis, prompting transport to [facility] this afternoon.  On arrival, he was noted to be hypothermic, acidotic and likely hyperkalemic (K: 6.4), with Cr 5.05, and sinus bradycardia to 40s BPM, patient seen by EP team earlier for sinus bradycardia.  Cardiology consult requested for fluid overload.  Patient otherwise unable to respond to ROS due to lethargy.  CEx1: 0.17  po-BNP: 4800  ECG: Sinus santo  Currently on Dopamine & Levophed gtts  HR: 60s       CARDIAC TESTING   ECHO: Pen  STRESS:  CATH:   ELECTROPHYSIOLOGY:     PAST MEDICAL HISTORY  HTN  Alcoholic Cirrhosis  CKD  Gout    PAST SURGICAL HISTORY    SOCIAL HISTORY:  Denies smoking/alcohol/drugs    FAMILY HISTORY:    Family History of Cardiovascular Disease:  Yes [  ] No [  ]  Coronary Artery Disease in first degree relative: Yes [  ] No [  ]  Sudden Cardiac Death in First degree relative: Yes [  ] No [  ]    HOME MEDICATIONS:  Allopurinol 300mg daily  Folic Acid  Levothyroxine 88mcg daily  metoprolol succinate 100mg daily   Thiamine    CURRENT CARDIAC MEDICATIONS:  DOPamine Infusion 2 MICROgram(s)/kG/Min IV Continuous <Continuous>  EPINEPHrine    Infusion 0.03 MICROgram(s)/kG/Min IV Continuous <Continuous>    CURRENT OTHER MEDICATIONS:  albumin human 25% IVPB 50 milliLiter(s) IV Intermittent Once, Stop order after: 1 Doses  sodium bicarbonate  Infusion 0.136 mEq/kG/Hr (100 mL/Hr) IV Continuous <Continuous>    ALLERGIES: doxycycline (Other (Mild to Mod))    REVIEW OF SYMPTOMS:   CONSTITUTIONAL: Lethargic  ALL OTHER REVIEW OF SYSTEMS ARE NEGATIVE.    VITAL SIGNS:  T(C): 33.3 (09-15-23 @ 19:35), Max: 36.7 (09-15-23 @ 13:46)  T(F): 92 (09-15-23 @ 19:35), Max: 98 (09-15-23 @ 13:46)  HR: 68 (09-15-23 @ 19:35) (42 - 68)  BP: 103/53 (09-15-23 @ 19:35) (89/42 - 105/51)  RR: 20 (09-15-23 @ 19:35) (17 - 20)  SpO2: 100% (09-15-23 @ 19:35) (99% - 100%)    INTAKE AND OUTPUT:    PHYSICAL EXAM:  Constitutional: Lethargy, NAD   HEENT: Atraumatic, neck is supple  CNS: lethargic   Respiratory: fine crackles at the back, no wheezing   Cardiovascular: S1S2, no murmur  Gastrointestinal: Soft, non-tender  Extremities: distal upper extremities cool to touch, non pitting LE edema; LE wrapped   Psychiatric: Calm   Skin: cool to touch    LABS:  ( 15 Sep 2023 17:31 )  Troponin T  X    ,  CPK  195  , CKMB  X    , BNP X        , ( 15 Sep 2023 16:25 )  Troponin T  0.17<H>,  CPK  X    , CKMB  X    , BNP X                           10.0   6.54  )-----------( 123      ( 15 Sep 2023 16:25 )             27.2     09-15    133<L>  |  102  |  61.2<H>  ----------------------------<  83  6.4<HH>   |  13.0<L>  |  5.30<H>    Ca    8.3<L>      15 Sep 2023 17:31  Phos  6.4     09-15  Mg     2.4     09-15    TPro  6.4<L>  /  Alb  2.0<L>  /  TBili  3.4<H>  /  DBili  x   /  AST  87<H>  /  ALT  33  /  AlkPhos  85  09-15    PT/INR - ( 15 Sep 2023 16:25 )   PT: 19.3 sec;   INR: 1.77 ratio      PTT - ( 15 Sep 2023 16:25 )  PTT:41.1 sec    Urinalysis Basic - ( 15 Sep 2023 17:31 )  Color: x / Appearance: x / SG: x / pH: x  Gluc: 83 mg/dL / Ketone: x  / Bili: x / Urobili: x   Blood: x / Protein: x / Nitrite: x   Leuk Esterase: x / RBC: x / WBC x   Sq Epi: x / Non Sq Epi: x / Bacteria: x    Thyroid Stimulating Hormone, Serum: 8.37 uIU/mL (09-15-23 @ 17:31)    INTERPRETATION OF TELEMETRY: Sinus rhythm    ECG: earlier Sinus santo   Prior ECG: Yes [  ] No [x]    RADIOLOGY & ADDITIONAL STUDIES:    X-ray:    CT Abdomen and Pelvis scan: IMPRESSION:  1.  Mild pulmonary edema. Bilateral pleural effusions.  2.  No hydronephrosis. 3 mm nonobstructive stone of the right kidney.  3.  Severe, diffuse subcutaneous edema.                                              [facility]                                              CARDIOLOGY AT [place] [address]                                             Telephone: [phone]. Fax:[phone]                                                       CARDIOLOGY CONSULTATION NOTE                                                                                             History obtained by: Patient and medical record  Community Cardiologist: Unknown   obtained: Yes [  ] No [  ]  Reason for Consultation: fluid overload  Available out pt records reviewed: Yes [x] No [  ]    Chief complaint:  AMS     HPI: Patient is a 69yo nursing home resident PMHx of HTN, Hypothyroid, Alcoholic cirrhosis and CKD, admitted after developing AMS and KIM.  History is limited by patient's poor mental status/somnolence.  Per transfer documents patient fell yesterday at NH and subsequently was noted to have increasing creatinine.  NH was concerned for rhabdomylysis, prompting transport to [facility] this afternoon.  On arrival, he was noted to be hypothermic, acidotic and likely hyperkalemic (K: 6.4), with Cr 5.05, and sinus bradycardia to 40s BPM, patient seen by EP team earlier for sinus bradycardia.  Cardiology consult requested for fluid overload.  Patient otherwise unable to respond to ROS due to lethargy.  CEx1: 0.17  po-BNP: 4800  ECG: Sinus santo  Currently on Dopamine & Levophed gtts  HR: 60s       CARDIAC TESTING   ECHO: Pen  STRESS:  CATH:   ELECTROPHYSIOLOGY:     PAST MEDICAL HISTORY  HTN  Alcoholic Cirrhosis  CKD  Gout    PAST SURGICAL HISTORY    SOCIAL HISTORY:  Denies smoking/alcohol/drugs    FAMILY HISTORY:    Family History of Cardiovascular Disease:  Yes [  ] No [  ]  Coronary Artery Disease in first degree relative: Yes [  ] No [  ]  Sudden Cardiac Death in First degree relative: Yes [  ] No [  ]    HOME MEDICATIONS:  Allopurinol 300mg daily  Folic Acid  Levothyroxine 88mcg daily  metoprolol succinate 100mg daily   Thiamine    CURRENT CARDIAC MEDICATIONS:  DOPamine Infusion 2 MICROgram(s)/kG/Min IV Continuous <Continuous>  EPINEPHrine    Infusion 0.03 MICROgram(s)/kG/Min IV Continuous <Continuous>    CURRENT OTHER MEDICATIONS:  albumin human 25% IVPB 50 milliLiter(s) IV Intermittent Once, Stop order after: 1 Doses  sodium bicarbonate  Infusion 0.136 mEq/kG/Hr (100 mL/Hr) IV Continuous <Continuous>    ALLERGIES: doxycycline (Other (Mild to Mod))    REVIEW OF SYMPTOMS:   CONSTITUTIONAL: Lethargic  ALL OTHER REVIEW OF SYSTEMS ARE NEGATIVE.    VITAL SIGNS:  T(C): 33.3 (09-15-23 @ 19:35), Max: 36.7 (09-15-23 @ 13:46)  T(F): 92 (09-15-23 @ 19:35), Max: 98 (09-15-23 @ 13:46)  HR: 68 (09-15-23 @ 19:35) (42 - 68)  BP: 103/53 (09-15-23 @ 19:35) (89/42 - 105/51)  RR: 20 (09-15-23 @ 19:35) (17 - 20)  SpO2: 100% (09-15-23 @ 19:35) (99% - 100%)    INTAKE AND OUTPUT:    PHYSICAL EXAM:  Constitutional: Lethargic but arousable, NAD   HEENT: Atraumatic, neck is supple  CNS: lethargic   Respiratory: fine crackles at the back, no wheezing   Cardiovascular: S1S2, no murmur  Gastrointestinal: Soft, non-tender  Extremities: distal upper extremities cool to touch, non pitting LE edema; LE wrapped   Psychiatric: Calm   Skin: cool to touch    LABS:  ( 15 Sep 2023 17:31 )  Troponin T  X    ,  CPK  195  , CKMB  X    , BNP X        , ( 15 Sep 2023 16:25 )  Troponin T  0.17<H>,  CPK  X    , CKMB  X    , BNP X                           10.0   6.54  )-----------( 123      ( 15 Sep 2023 16:25 )             27.2     09-15    133<L>  |  102  |  61.2<H>  ----------------------------<  83  6.4<HH>   |  13.0<L>  |  5.30<H>    Ca    8.3<L>      15 Sep 2023 17:31  Phos  6.4     09-15  Mg     2.4     09-15    TPro  6.4<L>  /  Alb  2.0<L>  /  TBili  3.4<H>  /  DBili  x   /  AST  87<H>  /  ALT  33  /  AlkPhos  85  09-15    PT/INR - ( 15 Sep 2023 16:25 )   PT: 19.3 sec;   INR: 1.77 ratio      PTT - ( 15 Sep 2023 16:25 )  PTT:41.1 sec    Urinalysis Basic - ( 15 Sep 2023 17:31 )  Color: x / Appearance: x / SG: x / pH: x  Gluc: 83 mg/dL / Ketone: x  / Bili: x / Urobili: x   Blood: x / Protein: x / Nitrite: x   Leuk Esterase: x / RBC: x / WBC x   Sq Epi: x / Non Sq Epi: x / Bacteria: x    Thyroid Stimulating Hormone, Serum: 8.37 uIU/mL (09-15-23 @ 17:31)    INTERPRETATION OF TELEMETRY: Sinus rhythm    ECG: earlier Sinus santo   Prior ECG: Yes [  ] No [x]    RADIOLOGY & ADDITIONAL STUDIES:    X-ray:    CT Abdomen and Pelvis scan: IMPRESSION:  1.  Mild pulmonary edema. Bilateral pleural effusions.  2.  No hydronephrosis. 3 mm nonobstructive stone of the right kidney.  3.  Severe, diffuse subcutaneous edema.                                              [facility]                                              CARDIOLOGY AT [place] [address]                                             Telephone: [phone]. Fax:[phone]                                                       CARDIOLOGY CONSULTATION NOTE                                                                                             History obtained by: Patient and medical record  Community Cardiologist: Unknown   obtained: Yes [  ] No [  ]  Reason for Consultation: fluid overload  Available out pt records reviewed: Yes [x] No [  ]    Chief complaint:  AMS     HPI: Patient is a 69yo nursing home resident PMHx of HTN, Hypothyroid, Alcoholic cirrhosis and CKD, admitted after developing AMS and KIM.  History is limited by patient's poor mental status/somnolence.  Per transfer documents patient fell yesterday at NH and subsequently was noted to have increasing creatinine.  NH was concerned for rhabdomylysis, prompting transport to [facility] this afternoon.  On arrival, he was noted to be hypothermic, acidotic and likely hyperkalemic (K: 6.4), with Cr 5.05, and sinus bradycardia to 40s BPM, patient seen by EP team earlier for sinus bradycardia.  Cardiology consult requested for fluid overload.  Patient otherwise unable to respond to ROS due to lethargy.  CEx1: 0.17  po-BNP: 4800  ECG: Sinus santo  Currently on Dopamine & Levophed gtts  HR: 60s       CARDIAC TESTING   ECHO: Pen  STRESS:  CATH:   ELECTROPHYSIOLOGY:     PAST MEDICAL HISTORY  HTN  Alcoholic Cirrhosis  CKD  Gout    PAST SURGICAL HISTORY    SOCIAL HISTORY:  Denies smoking/alcohol/drugs    FAMILY HISTORY:    Family History of Cardiovascular Disease:  Yes [  ] No [  ]  Coronary Artery Disease in first degree relative: Yes [  ] No [  ]  Sudden Cardiac Death in First degree relative: Yes [  ] No [  ]    HOME MEDICATIONS:  Allopurinol 300mg daily  Folic Acid  Levothyroxine 88mcg daily  metoprolol succinate 100mg daily   Thiamine    CURRENT CARDIAC MEDICATIONS:  DOPamine Infusion 2 MICROgram(s)/kG/Min IV Continuous <Continuous>  EPINEPHrine    Infusion 0.03 MICROgram(s)/kG/Min IV Continuous <Continuous>    CURRENT OTHER MEDICATIONS:  albumin human 25% IVPB 50 milliLiter(s) IV Intermittent Once, Stop order after: 1 Doses  sodium bicarbonate  Infusion 0.136 mEq/kG/Hr (100 mL/Hr) IV Continuous <Continuous>    ALLERGIES: doxycycline (Other (Mild to Mod))    REVIEW OF SYMPTOMS:   CONSTITUTIONAL: Lethargic  ALL OTHER REVIEW OF SYSTEMS ARE NEGATIVE.    VITAL SIGNS:  T(C): 33.3 (09-15-23 @ 19:35), Max: 36.7 (09-15-23 @ 13:46)  T(F): 92 (09-15-23 @ 19:35), Max: 98 (09-15-23 @ 13:46)  HR: 68 (09-15-23 @ 19:35) (42 - 68)  BP: 103/53 (09-15-23 @ 19:35) (89/42 - 105/51)  RR: 20 (09-15-23 @ 19:35) (17 - 20)  SpO2: 100% (09-15-23 @ 19:35) (99% - 100%)    INTAKE AND OUTPUT:    PHYSICAL EXAM:  Constitutional: Lethargic but arousable, NAD   HEENT: Atraumatic, neck is supple  CNS: lethargic   Respiratory: fine crackles at the back, no wheezing   Cardiovascular: S1S2, no murmur  Gastrointestinal: Soft, non-tender  Extremities: distal upper extremities cool to touch, non pitting LE edema; LE wrapped   Psychiatric: Calm   Skin: cool to touch    LABS:  ( 15 Sep 2023 17:31 )  Troponin T  X    ,  CPK  195  , CKMB  X    , BNP X        , ( 15 Sep 2023 16:25 )  Troponin T  0.17<H>,  CPK  X    , CKMB  X    , BNP X                           10.0   6.54  )-----------( 123      ( 15 Sep 2023 16:25 )             27.2     09-15    133<L>  |  102  |  61.2<H>  ----------------------------<  83  6.4<HH>   |  13.0<L>  |  5.30<H>    Ca    8.3<L>      15 Sep 2023 17:31  Phos  6.4     09-15  Mg     2.4     09-15    TPro  6.4<L>  /  Alb  2.0<L>  /  TBili  3.4<H>  /  DBili  x   /  AST  87<H>  /  ALT  33  /  AlkPhos  85  09-15    PT/INR - ( 15 Sep 2023 16:25 )   PT: 19.3 sec;   INR: 1.77 ratio      PTT - ( 15 Sep 2023 16:25 )  PTT:41.1 sec    Urinalysis Basic - ( 15 Sep 2023 17:31 )  Color: x / Appearance: x / SG: x / pH: x  Gluc: 83 mg/dL / Ketone: x  / Bili: x / Urobili: x   Blood: x / Protein: x / Nitrite: x   Leuk Esterase: x / RBC: x / WBC x   Sq Epi: x / Non Sq Epi: x / Bacteria: x    Thyroid Stimulating Hormone, Serum: 8.37 uIU/mL (09-15-23 @ 17:31)    INTERPRETATION OF TELEMETRY: Sinus rhythm    ECG: earlier Sinus santo   Prior ECG: Yes [  ] No [x]    RADIOLOGY & ADDITIONAL STUDIES:    X-ray:    CT Abdomen and Pelvis scan: IMPRESSION:  1.  Mild pulmonary edema. Bilateral pleural effusions.  2.  No hydronephrosis. 3 mm nonobstructive stone of the right kidney.  3.  Severe, diffuse subcutaneous edema.                                              [facility]                                              CARDIOLOGY AT [place] [address]                                             Telephone: [phone]. Fax:[phone]                                                       CARDIOLOGY CONSULTATION NOTE                                                                                             History obtained by: Patient and medical record  Community Cardiologist: Unknown   obtained: Yes [  ] No [  ]  Reason for Consultation: fluid overload  Available out pt records reviewed: Yes [x] No [  ]    Chief complaint:  AMS     HPI: Patient is a 67yo nursing home resident PMHx of HTN, Hypothyroid, Alcoholic cirrhosis and CKD, admitted after developing AMS and KIM.  History is limited by patient's poor mental status/somnolence.  Per transfer documents patient fell yesterday at NH and subsequently was noted to have increasing creatinine.  NH was concerned for rhabdomylysis, prompting transport to [facility] this afternoon.  On arrival, he was noted to be hypothermic, acidotic and likely hyperkalemic (K: 6.4), with Cr 5.05, and sinus bradycardia to 40s BPM, patient seen by EP team earlier for sinus bradycardia.  Cardiology consult requested for fluid overload.  Patient otherwise unable to respond to ROS due to lethargy.  CEx1: 0.17  po-BNP: 4800  ECG: Sinus santo  Currently on Dopamine & Levophed gtts  HR: 60s       CARDIAC TESTING   ECHO: Pen  STRESS:  CATH:   ELECTROPHYSIOLOGY:     PAST MEDICAL HISTORY  HTN  Alcoholic Cirrhosis  CKD  Gout    PAST SURGICAL HISTORY    SOCIAL HISTORY:  Denies smoking/alcohol/drugs    FAMILY HISTORY:    Family History of Cardiovascular Disease:  Yes [  ] No [  ]  Coronary Artery Disease in first degree relative: Yes [  ] No [  ]  Sudden Cardiac Death in First degree relative: Yes [  ] No [  ]    HOME MEDICATIONS:  Allopurinol 300mg daily  Folic Acid  Levothyroxine 88mcg daily  metoprolol succinate 100mg daily   Thiamine    CURRENT CARDIAC MEDICATIONS:  DOPamine Infusion 2 MICROgram(s)/kG/Min IV Continuous <Continuous>  EPINEPHrine    Infusion 0.03 MICROgram(s)/kG/Min IV Continuous <Continuous>    CURRENT OTHER MEDICATIONS:  albumin human 25% IVPB 50 milliLiter(s) IV Intermittent Once, Stop order after: 1 Doses  sodium bicarbonate  Infusion 0.136 mEq/kG/Hr (100 mL/Hr) IV Continuous <Continuous>    ALLERGIES: doxycycline (Other (Mild to Mod))    REVIEW OF SYMPTOMS:   CONSTITUTIONAL: Lethargic  ALL OTHER REVIEW OF SYSTEMS ARE NEGATIVE.    VITAL SIGNS:  T(C): 33.3 (09-15-23 @ 19:35), Max: 36.7 (09-15-23 @ 13:46)  T(F): 92 (09-15-23 @ 19:35), Max: 98 (09-15-23 @ 13:46)  HR: 68 (09-15-23 @ 19:35) (42 - 68)  BP: 103/53 (09-15-23 @ 19:35) (89/42 - 105/51)  RR: 20 (09-15-23 @ 19:35) (17 - 20)  SpO2: 100% (09-15-23 @ 19:35) (99% - 100%)    INTAKE AND OUTPUT:    PHYSICAL EXAM:  Constitutional: Lethargic but arousable, NAD   HEENT: Atraumatic, neck is supple  CNS: lethargic   Respiratory: fine crackles at the back, no wheezing   Cardiovascular: S1S2, no murmur  Gastrointestinal: Soft, non-tender  Extremities: distal upper extremities cool to touch, non pitting LE edema; LE wrapped   Psychiatric: Calm   Skin: cool to touch    LABS:  ( 15 Sep 2023 17:31 )  Troponin T  X    ,  CPK  195  , CKMB  X    , BNP X        , ( 15 Sep 2023 16:25 )  Troponin T  0.17<H>,  CPK  X    , CKMB  X    , BNP X                           10.0   6.54  )-----------( 123      ( 15 Sep 2023 16:25 )             27.2     09-15    133<L>  |  102  |  61.2<H>  ----------------------------<  83  6.4<HH>   |  13.0<L>  |  5.30<H>    Ca    8.3<L>      15 Sep 2023 17:31  Phos  6.4     09-15  Mg     2.4     09-15    TPro  6.4<L>  /  Alb  2.0<L>  /  TBili  3.4<H>  /  DBili  x   /  AST  87<H>  /  ALT  33  /  AlkPhos  85  09-15    PT/INR - ( 15 Sep 2023 16:25 )   PT: 19.3 sec;   INR: 1.77 ratio      PTT - ( 15 Sep 2023 16:25 )  PTT:41.1 sec    Urinalysis Basic - ( 15 Sep 2023 17:31 )  Color: x / Appearance: x / SG: x / pH: x  Gluc: 83 mg/dL / Ketone: x  / Bili: x / Urobili: x   Blood: x / Protein: x / Nitrite: x   Leuk Esterase: x / RBC: x / WBC x   Sq Epi: x / Non Sq Epi: x / Bacteria: x    Thyroid Stimulating Hormone, Serum: 8.37 uIU/mL (09-15-23 @ 17:31)    INTERPRETATION OF TELEMETRY: Sinus rhythm    ECG: earlier Sinus santo   Prior ECG: Yes [  ] No [x]    RADIOLOGY & ADDITIONAL STUDIES:    X-ray:    CT Abdomen and Pelvis scan: IMPRESSION:  1.  Mild pulmonary edema. Bilateral pleural effusions.  2.  No hydronephrosis. 3 mm nonobstructive stone of the right kidney.  3.  Severe, diffuse subcutaneous edema.

## 2023-09-15 NOTE — CONSULT NOTE ADULT - ASSESSMENT
68M NH resident h/o HTN, hypothyroid, alcoholic cirrhosis and CKD, p/w AMS. Ability to obtain history is limited by pt's poor mental status/somnolence. On arrival, pt is noted to be hypothermic, acidotic, and hyperkalemic, with Cr 5.05, and sinus bradycardia to 40s bpm.  Nephrology is consulted for evaluation of RRT needs.      1. Acute kidney injury on CKD, NOS:  -KIM likely hemodynamically mediated in a patient w/ bradycardia    -Baseline SCr: unclear, SCr on admission 5.05 and now 5.3 mg/dl  -UA: NA  -Imaging: KIDNEYS/URETERS: No hydronephrosis. A 3 mm nonobstructive stone of the interpolar right kidney.  BLADDER: Decompressed around a Mendez catheter.    -Given hyperkalemia, metabolic acidosis and anuria recommend urgent RRT  Recommend placement of temp HD catheter and CRRT     2.  Hypotension: on pressors and inotrope.    3.  Metabolic acidosis: He was started on bicarb gtt. RRT as above.   4.  Hyperkalemia: Lokelma 10, medical cocktail and RRT as above.   5.  Bradycardia likely to MA, hyperK and hypothermia.     Prognosis guarded.

## 2023-09-15 NOTE — CONSULT NOTE ADULT - PROBLEM SELECTOR RECOMMENDATION 2
On admission bradycardic, likely due to metabolic issues like hyperkalemia and acidosis.   - also on Metoprolol at home, likely culprit due to decreased clearance from acute kidney injury  - on blood pressure support with vasopressors (dopamine)  - TTE Pen

## 2023-09-15 NOTE — CONSULT NOTE ADULT - ASSESSMENT
68 year old male nursing home resident h/o HTN, hypothyroid, alcoholic cirrhosis and CKD, p/w AMS and KIM. Ability to obtain history is limited by pt's poor mental status/somnolence. Per report, pt fell yesterday at NH and subsequently was noted to have increasing creatinine. NH was concerned for rhabdomylysis, prompting transport to [facility]. On arrival, pt is noted to be hypothermic, acidotic, and likely hyperkalemic (6 on BMP but sample hemolyzed w/ repeat pending; 7.2on VBG), with Cr 5.05, and sinus bradycardia to 40s bpm. EP is consulted for sinus bradycardia.     Recommendations:   Sinus bradycardia  - unlikely primary sinus node disease  - suspect HRs depressed in the setting of KIM, hypothermia, hyperkalemia, exacerbated by high dose BB  - Give glucogon now.   - can use dopamine gtt for HR support with atropine PRN while addressing underlying medical issues.    - Hold metoprolol.   - check thyroid function  - Check echo  - Repeat K pending  - Pt has MOLST on file from NH.     Will d/w Dr. Cabrera; further recs to follow.  68 year old male nursing home resident h/o HTN, hypothyroid, alcoholic cirrhosis and CKD, p/w AMS and KIM. Ability to obtain history is limited by pt's poor mental status/somnolence. Per report, pt fell yesterday at NH and subsequently was noted to have increasing creatinine. NH was concerned for rhabdomylysis, prompting transport to [facility]. On arrival, pt is noted to be hypothermic, acidotic, and likely hyperkalemic (6 on BMP but sample hemolyzed w/ repeat pending; 7.2on VBG), with Cr 5.05, and sinus bradycardia to 40s bpm. EP is consulted for sinus bradycardia.     Recommendations:   Sinus bradycardia  - unlikely primary sinus node disease  - suspect HRs depressed in the setting of KIM, hypothermia, hyperkalemia, exacerbated by high dose BB  - Give glucogon now.   - can use dopamine gtt for HR support with atropine PRN while addressing underlying medical issues.    - Hold metoprolol.   - check thyroid function  - Check echo  - Repeat K pending  - Pt has MOLST on file from NH.     Will d/w Dr. Cabrera; further recs to follow.  68 year old male nursing home resident h/o HTN, hypothyroid, alcoholic cirrhosis and CKD, p/w AMS and KIM. Ability to obtain history is limited by pt's poor mental status/somnolence. Per report, pt fell yesterday at NH and subsequently was noted to have increasing creatinine. NH was concerned for rhabdomylysis, prompting transport to [facility]. On arrival, pt is noted to be hypothermic, acidotic, and likely hyperkalemic (6 on BMP but sample hemolyzed w/ repeat pending; 7.2on VBG), with Cr 5.05, and sinus bradycardia to 40s bpm. EP is consulted for sinus bradycardia.     Recommendations:   Sinus bradycardia  - unlikely primary sinus node disease  - suspect HRs depressed in the setting of KIM, hypothermia, hyperkalemia, exacerbated by high dose BB  - Give glucogon now.   - can use dopamine gtt for HR support with atropine PRN while addressing underlying medical issues.    - Hold metoprolol.   - check thyroid function  - Check echo  - Repeat K pending  - Pt has MOLST on file from NH.     Will d/w Dr. Cabrera; further recs to follow.  68 year old male nursing home resident h/o HTN, hypothyroid, alcoholic cirrhosis and CKD, p/w AMS and KIM. Ability to obtain history is limited by pt's poor mental status/somnolence. Per report, pt fell yesterday at NH and subsequently was noted to have increasing creatinine. NH was concerned for rhabdomylysis, prompting transport to [facility]. On arrival, pt is noted to be hypothermic, acidotic, and likely hyperkalemic (6 on BMP but sample hemolyzed w/ repeat pending; 7.2on VBG), with Cr 5.05, and sinus bradycardia to 40s bpm. EP is consulted for sinus bradycardia.     Recommendations:   Sinus bradycardia  - unlikely primary sinus node disease  - suspect HRs depressed in the setting of KIM, hypothermia, hyperkalemia, exacerbated by high dose BB  - Give glucogon now.   - can use dopamine gtt for HR support with atropine PRN while addressing underlying medical issues.    - Hold metoprolol.   - Troponin mildly elevated in the setting of acute on chronic renal insufficiency - low suspicion for ACS at this point.   - check thyroid function  - Check echo  - Repeat K pending  - Pt has MOLST on file from NH.     Will d/w Dr. Cabrera; further recs to follow.  68 year old male nursing home resident h/o HTN, hypothyroid, alcoholic cirrhosis and CKD, p/w AMS and KIM. Ability to obtain history is limited by pt's poor mental status/somnolence. Per report, pt fell yesterday at NH and subsequently was noted to have increasing creatinine. NH was concerned for rhabdomylysis, prompting transport to [facility]. On arrival, pt is noted to be hypothermic, acidotic, and likely hyperkalemic (6 on BMP but sample hemolyzed w/ repeat pending; 7.2on VBG), with Cr 5.05, and sinus bradycardia to 40s bpm. EP is consulted for sinus bradycardia.     Recommendations:   Sinus bradycardia  - unlikely primary sinus node disease  - suspect HRs depressed in the setting of KIM, hypothermia, hyperkalemia, exacerbated by high dose BB  - Give glucogon now.   - can use dopamine gtt for HR support with atropine PRN while addressing underlying medical issues.    - Hold metoprolol.   - Troponin mildly elevated in the setting of acute on chronic renal insufficiency - low suspicion for ACS at this point.   - check thyroid function  - Check echo  - Repeat K pending  - Pt has MOLST on file from NH.     Will d/w Dr. Cabrera; further recs to follow.  68 year old male nursing home resident h/o HTN, hypothyroid, alcoholic cirrhosis and CKD, p/w AMS and KIM. Ability to obtain history is limited by pt's poor mental status/somnolence. Per report, pt fell yesterday at NH and subsequently was noted to have increasing creatinine. NH was concerned for rhabdomylysis, prompting transport to [facility]. On arrival, pt is noted to be hypothermic, acidotic, and likely hyperkalemic (6 on BMP but sample hemolyzed w/ repeat pending; 7.2on VBG), with Cr 5.05, and sinus bradycardia to 40s bpm. EP is consulted for sinus bradycardia.     Recommendations:   Sinus bradycardia  - unlikely primary sinus node disease  - suspect HRs depressed in the setting of KIM, hypothermia, hyperkalemia, exacerbated by high dose BB  - Give glucogon now.   - can use dopamine gtt for HR support with atropine PRN while addressing underlying medical issues.    - Hold metoprolol.   - Troponin mildly elevated in the setting of acute on chronic renal insufficiency - low suspicion for ACS at this point.   - check thyroid function  - Check echo  - Repeat K pending  - Pt has MOLST on file from NH.     Will d/w Dr. Cabrera; further recs to follow.  68 year old male nursing home resident h/o HTN, hypothyroid, alcoholic cirrhosis and CKD, p/w AMS and KIM. Ability to obtain history is limited by pt's poor mental status/somnolence. Per report, pt fell yesterday at NH and subsequently was noted to have increasing creatinine. NH was concerned for rhabdomylysis, prompting transport to [facility]. On arrival, pt is noted to be hypothermic, acidotic, and likely hyperkalemic (6 on BMP but sample hemolyzed w/ repeat pending; 7.2on VBG), with Cr 5.05, and sinus bradycardia to 40s bpm. EP is consulted for sinus bradycardia.     Recommendations:   Sinus bradycardia  - unlikely primary sinus node disease  - suspect HRs depressed in the setting of KIM, hypothermia, hyperkalemia, exacerbated by high dose BB  - Give glucagon now.   - can use dopamine or epinephrine gtt for HR/BP support with atropine PRN while addressing underlying medical issues.    - Hold metoprolol.   - Troponin mildly elevated in the setting of acute on chronic renal insufficiency - low suspicion for ACS at this point.   - check thyroid function  - Check echo  - Repeat K pending  - Pt has MOLST on file from NH.     Will d/w Dr. Cabrera; further recs to follow.  68 year old male nursing home resident h/o HTN, hypothyroid, alcoholic cirrhosis and CKD, p/w AMS and KIM. Ability to obtain history is limited by pt's poor mental status/somnolence. Per report, pt fell yesterday at NH and subsequently was noted to have increasing creatinine. NH was concerned for rhabdomylysis, prompting transport to [facility]. On arrival, pt is noted to be hypothermic, acidotic, and likely hyperkalemic (6 on BMP but sample hemolyzed w/ repeat pending; 7.2on VBG), with Cr 5.05, and sinus bradycardia to 40s bpm. EP is consulted for sinus bradycardia.     Recommendations:   Sinus bradycardia  - unlikely primary sinus node disease  - suspect HRs depressed in the setting of KIM, hypothermia, hyperkalemia, exacerbated by high dose BB  - Give glucagon now.   - can use dopamine or epinephrine gtt for HR/BP support with atropine PRN while addressing underlying medical issues.    - Hold metoprolol.   - Troponin mildly elevated in the setting of acute on chronic renal insufficiency - low suspicion for ACS at this point.   - check thyroid function  - Check echo  - Repeat K pending  - Pt has MOLST on file from NH.     Will d/w Dr. Cabrera; further recs to follow.  68 year old male nursing home resident h/o HTN, hypothyroid, alcoholic cirrhosis and CKD, p/w AMS and KIM. Ability to obtain history is limited by pt's poor mental status/somnolence. Per report, pt fell yesterday at NH and subsequently was noted to have increasing creatinine. NH was concerned for rhabdomylysis, prompting transport to [facility]. On arrival, pt is noted to be hypothermic, acidotic, and likely hyperkalemic (6 on BMP but sample hemolyzed w/ repeat pending; 7.2on VBG), with Cr 5.05, and sinus bradycardia to 40s bpm. EP is consulted for sinus bradycardia.     Recommendations:   Sinus bradycardia  - unlikely primary sinus node disease  - suspect HRs depressed in the setting of KIM, hypothermia, hyperkalemia, exacerbated by high dose BB  - Give glucagon now.   - can use dopamine or epinephrine gtt for HR/BP support with atropine PRN while addressing underlying medical issues.    - Hold metoprolol.   - Troponin mildly elevated in the setting of acute on chronic renal insufficiency - low suspicion for ACS at this point.   - check thyroid function  - Check echo  - Repeat K pending  - Pt has MOLST on file from NH.     Will d/w Dr. Cabrera; further recs to follow.

## 2023-09-15 NOTE — H&P ADULT - ASSESSMENT
Pt is a 67 y/o M pmhx of HTN, hypothyroid, ETOH cirrhosis and CKD who presents from nursing home for AMS and s/p fall. Pt was transported to [facility] by EMS for concern for rhabdomyolysis, in ED pt found to be hypothermic, acidotic and hyperkalemic, w/ serum Cr of 5.05. Pt found to be in sinus bradycardia w/ HR in the 40's, concern for junctional rhythm.     1. ARF   2. CKD   3. Hyperkalemia   4. Junctional bradycardia   5. B/L Pleural effusions   6. Pulmonary edema   7. Encephalopathy   8. Shock     Plan:     Neuro: Confused, likely encephalopathic in setting of ARF     CV: Shock state and bradycardic likely secondary to hyperkalemia in setting of ARF, started on EPI gtt, Actively titrate to maintain MAP>65 and HR >60.    Pulm: Pulmonary edema in setting of suspected volume overload in setting of ARF, monitor and will use positive pressure ventilation if needed for respiratory support.     GI: Diet: NPO, protonix for GI PPX     Renal: KIM/ARF on CKD, now w/ Cr. of 5 and hyperkalemic w/ minimal urine output, repeat BMP w/o improvement in hyperkalemia, nephro following recommending CVVHD, will emergently dialyze in MICU.     Endo: Glucose<180, hyperkalemia, will give 10 units insulin IVP, received 2gm calcium, repeat BMP Q4 hrs, Will emergently dialyze now.     Heme: Heparin for DVT PPX     ID: Blood cultures sent, possible septic component, received dose of Vancomycin and zosyn in ED. Follow cultures and narrow abx as indicated. Trend markers of infection daily.     Case discussed w/ attending Dr. Augustin.     54 minutes assessing presenting problems of acute illness, which pose high probability of life threatening deterioration or end organ damage/dysfunction, as well as medical decision making including initiating plan of care, reviewing data, reviewing radiologic exams, discussing with multidisciplinary team,  discussing goals of care with patient/family, and writing this note.  Non-inclusive of procedures performed,  Pt is a 67 y/o M pmhx of HTN, hypothyroid, ETOH cirrhosis and CKD who presents from nursing home for AMS and s/p fall. Pt was transported to [facility] by EMS for concern for rhabdomyolysis, in ED pt found to be hypothermic, acidotic and hyperkalemic, w/ serum Cr of 5.05. Pt found to be in sinus bradycardia w/ HR in the 40's, concern for junctional rhythm.     1. ARF   2. CKD   3. Hyperkalemia   4. Junctional bradycardia   5. B/L Pleural effusions   6. Pulmonary edema   7. Encephalopathy   8. Shock     Plan:     Neuro: Confused, likely encephalopathic in setting of ARF     CV: Shock state and bradycardic likely secondary to hyperkalemia in setting of ARF, started on EPI gtt, Actively titrate to maintain MAP>65 and HR >60.    Pulm: Pulmonary edema in setting of suspected volume overload in setting of ARF, monitor and will use positive pressure ventilation if needed for respiratory support.     GI: Diet: NPO, protonix for GI PPX     Renal: KIM/ARF on CKD, now w/ Cr. of 5 and hyperkalemic w/ minimal urine output, repeat BMP w/o improvement in hyperkalemia, nephro following recommending CVVHD, will emergently dialyze in MICU.     Endo: Glucose<180, hyperkalemia, will give 10 units insulin IVP, received 2gm calcium, repeat BMP Q4 hrs, Will emergently dialyze now.     Heme: Heparin for DVT PPX     ID: Blood cultures sent, possible septic component, received dose of Vancomycin and zosyn in ED. Follow cultures and narrow abx as indicated. Trend markers of infection daily.     Case discussed w/ attending Dr. Augustin.     54 minutes assessing presenting problems of acute illness, which pose high probability of life threatening deterioration or end organ damage/dysfunction, as well as medical decision making including initiating plan of care, reviewing data, reviewing radiologic exams, discussing with multidisciplinary team,  discussing goals of care with patient/family, and writing this note.  Non-inclusive of procedures performed,  Pt is a 69 y/o M pmhx of HTN, hypothyroid, ETOH cirrhosis and CKD who presents from nursing home for AMS and s/p fall. Pt was transported to [facility] by EMS for concern for rhabdomyolysis, in ED pt found to be hypothermic, acidotic and hyperkalemic, w/ serum Cr of 5.05. Pt found to be in sinus bradycardia w/ HR in the 40's, concern for junctional rhythm.     1. ARF   2. CKD   3. Hyperkalemia   4. Junctional bradycardia   5. B/L Pleural effusions   6. Pulmonary edema   7. Encephalopathy   8. Shock     Plan:     Neuro: Confused, likely encephalopathic in setting of ARF     CV: Shock state and bradycardic likely secondary to hyperkalemia in setting of ARF, started on EPI gtt, Actively titrate to maintain MAP>65 and HR >60.    Pulm: Pulmonary edema in setting of suspected volume overload in setting of ARF, monitor and will use positive pressure ventilation if needed for respiratory support.     GI: Diet: NPO, protonix for GI PPX     Renal: KIM/ARF on CKD, now w/ Cr. of 5 and hyperkalemic w/ minimal urine output, repeat BMP w/o improvement in hyperkalemia, nephro following recommending CVVHD, will emergently dialyze in MICU.     Endo: Glucose<180, hyperkalemia, will give 10 units insulin IVP, received 2gm calcium, repeat BMP Q4 hrs, Will emergently dialyze now.     Heme: Heparin for DVT PPX     ID: Blood cultures sent, possible septic component, received dose of Vancomycin and zosyn in ED. Follow cultures and narrow abx as indicated. Trend markers of infection daily.     Case discussed w/ attending Dr. Augustin.     54 minutes assessing presenting problems of acute illness, which pose high probability of life threatening deterioration or end organ damage/dysfunction, as well as medical decision making including initiating plan of care, reviewing data, reviewing radiologic exams, discussing with multidisciplinary team,  discussing goals of care with patient/family, and writing this note.  Non-inclusive of procedures performed,

## 2023-09-15 NOTE — CONSULT NOTE ADULT - PROBLEM SELECTOR RECOMMENDATION 3
Unknown Serum Cr (on admission 5.05 and now 5.3 mg/dl)  - followed by Nephrology  - CT abdomen: No hydronephrosis. A 3 mm nonobstructive stone of the interpolar right kidney.  BLADDER: Decompressed around a Mendez catheter.  - patient w/ hyperkalemia, metabolic acidosis and anuria, placement of temp HD catheter and CRRT recommended by Nephrology

## 2023-09-15 NOTE — PATIENT PROFILE ADULT - FUNCTIONAL ASSESSMENT - BASIC MOBILITY 6.
1-calculated by average/Not able to assess (calculate score using [facility] averaging method)  1-calculated by average/Not able to assess (calculate score using [facility] averaging method)  1-calculated by average/Not able to assess (calculate score using [facility] averaging method)

## 2023-09-15 NOTE — ED PROVIDER NOTE - PHYSICAL EXAMINATION
Gen: NAD, appears tired though rouses verbally to his name, remains minimally conversational  Eyes: PERRLA, EOMI   HENT: Normocephalic, atraumatic. External ears normal, no rhinorrhea, tacky mucous membranes.   CV: borderline bradycardia, regular rhythm, no M/R/G  Resp: CTAB though breath sounds are distant 2/2 body habitus, non-labored, speaking occasionally without difficulty on room air  Abd: soft, non tender, non rigid, no guarding or rebound tenderness  Back: No CVAT bilaterally, no midline ttp  Skin: dry, wwp   Neuro: appears tired though rouses to verbal stim, occasionally opens eyes to say "hello" and things like this   Psych: Mood "I'm ok", affect flat

## 2023-09-15 NOTE — ED PROVIDER NOTE - OBJECTIVE STATEMENT
Patient BIBEMS for evaluation from [facility]. Patient minimally verbal so hx obtained via [facility]ra at [facility].     A.MLiza Penaloza: BUN 54, creatnine 4.27; hx of creatinine 2.67 on 9/11. Blood work on 9/14 showing 4.27. Had a fall yesterday OOB, had a urinalysis sent but has not resulted yet. Is DNR/DNI; was sent with a MOLST per report. Patient BIBEMS for evaluation from [facility]. Patient minimally verbal so hx obtained via Andra at [facility].     A.MLiza Penaloza: BUN 54, creatnine 4.27; hx of creatinine 2.67 on 9/11. Blood work on 9/14 showing 4.27. Had a fall yesterday OOB, had a urinalysis sent but has not resulted yet. Is DNR/DNI; was sent with a MOLST per report. Patient BIBEMS for evaluation from [facility]. Patient minimally verbal so hx obtained via Andra at [facility].     A.MLiza Penaloza: BUN 54, creatnine 4.27; hx of creatinine 2.67 on 9/11. Blood work on 9/14 showing 4.27. Had a fall yesterday OOB, had a urinalysis sent but has not resulted yet. Is DNR/DNI; was sent with a MOLST per report.

## 2023-09-15 NOTE — PATIENT PROFILE ADULT - FALL HARM RISK - HARM RISK INTERVENTIONS
Assistance with ambulation/Assistance OOB with selected safe patient handling equipment/Communicate Risk of Fall with Harm to all staff/Discuss with provider need for PT consult/Monitor gait and stability/Provide patient with walking aids - walker, cane, crutches/Reinforce activity limits and safety measures with patient and family/Tailored Fall Risk Interventions/Use of alarms - bed, chair and/or voice tab/Visual Cue: Yellow wristband and red socks/Bed in lowest position, wheels locked, appropriate side rails in place/Call bell, personal items and telephone in reach/Instruct patient to call for assistance before getting out of bed or chair/Non-slip footwear when patient is out of bed/[place] to call system/Physically safe environment - no spills, clutter or unnecessary equipment/Purposeful Proactive Rounding/Room/bathroom lighting operational, light cord in reach Assistance with ambulation/Assistance OOB with selected safe patient handling equipment/Communicate Risk of Fall with Harm to all staff/Discuss with provider need for PT consult/Monitor gait and stability/Provide patient with walking aids - walker, cane, crutches/Reinforce activity limits and safety measures with patient and family/Tailored Fall Risk Interventions/Use of alarms - bed, chair and/or voice tab/Visual Cue: Yellow wristband and red socks/Bed in lowest position, wheels locked, appropriate side rails in place/Call bell, personal items and telephone in reach/Instruct patient to call for assistance before getting out of bed or chair/Non-slip footwear when patient is out of bed/[place] to call system/Physically safe environment - no spills, clutter or unnecessary equipment/Purposeful Proactive Rounding/Room/bathroom lighting operational, light cord in reach Assistance with ambulation/Assistance OOB with selected safe patient handling equipment/Communicate Risk of Fall with Harm to all staff/Discuss with provider need for PT consult/Monitor gait and stability/Provide patient with walking aids - walker, cane, crutches/Reinforce activity limits and safety measures with patient and family/Tailored Fall Risk Interventions/Use of alarms - bed, chair and/or voice tab/Visual Cue: Yellow wristband and red socks/Bed in lowest position, wheels locked, appropriate side rails in place/Call bell, personal items and telephone in reach/Instruct patient to call for assistance before getting out of bed or chair/Non-slip footwear when patient is out of bed/[place] to call system/Physically safe environment - no spills, clutter or unnecessary equipment/Purposeful Proactive Rounding/Room/bathroom lighting operational, light cord in reach

## 2023-09-15 NOTE — CONSULT NOTE ADULT - SUBJECTIVE AND OBJECTIVE BOX
HPI: 68M NH resident h/o HTN, hypothyroid, alcoholic cirrhosis and CKD, p/w AMS. Ability to obtain history is limited by pt's poor mental status/somnolence. Per report, pt fell yesterday at NH and subsequently was noted to have increasing creatinine. NH was concerned for rhabdomylysis, prompting transport to [facility]. On arrival, pt is noted to be hypothermic, acidotic, and hyperkalemic, with Cr 5.05, and sinus bradycardia to 40s bpm.  Nephrology is consulted for evaluation of RRT needs.      PAST MEDICAL & SURGICAL HISTORY: Unable to obtain secondary to patient current clinical condition.     FAMILY HISTORY: Unable to obtain secondary to patient current clinical condition.     SOCIAL HISTORY: NHR, Unable to obtain secondary to patient current clinical condition.     MEDICATIONS  (STANDING):  albumin human 25% IVPB 50 milliLiter(s) IV Intermittent Once  DOPamine Infusion 2 MICROgram(s)/kG/Min (8.3 mL/Hr) IV Continuous <Continuous>  EPINEPHrine    Infusion 0.03 MICROgram(s)/kG/Min (12.5 mL/Hr) IV Continuous <Continuous>    ALLERGIES:  doxycycline (Other (Mild to Mod))    REVIEW OF SYSTEMS: Unable to obtain secondary to patient current clinical condition.     Vital Signs Last 24 Hrs  T(C): 32.5 (15 Sep 2023 18:25), Max: 36.7 (15 Sep 2023 13:46)  T(F): 90.5 (15 Sep 2023 18:25), Max: 98 (15 Sep 2023 13:46)  HR: 68 (15 Sep 2023 18:25) (42 - 68)  BP: 105/51 (15 Sep 2023 18:25) (89/42 - 105/51)  RR: 20 (15 Sep 2023 18:25) (17 - 20)  SpO2: 100% (15 Sep 2023 18:25) (99% - 100%)    Parameters below as of 15 Sep 2023 18:25  Patient On (Oxygen Delivery Method): room air        PHYSICAL EXAM:  GENERAL: NAD, well-groomed, well-developed  HEAD:  Atraumatic, Normocephalic  EYES: EOMI, PERRLA, conjunctiva and sclera clear  ENMT: No tonsillar erythema, exudates, or enlargement; Moist mucous membranes, Good dentition, No lesions  NECK: Supple, No JVD, Normal thyroid  NERVOUS SYSTEM:  Alert & Oriented X3, Good concentration; Motor Strength 5/5 B/L upper and lower extremities; DTRs 2+ intact and symmetric  CHEST/LUNG: Clear to percussion bilaterally; No rales, rhonchi, wheezing, or rubs  HEART: Regular rate and rhythm; No murmurs, rubs, or gallops  ABDOMEN: Soft, Nontender, Nondistended; Bowel sounds present  EXTREMITIES:  2+ Peripheral Pulses, No clubbing, cyanosis, or edema  LYMPH: No lymphadenopathy noted  SKIN: No rashes or lesions    LABS:                        10.0   6.54  )-----------( 123      ( 15 Sep 2023 16:25 )             27.2     09-15    133<L>  |  102  |  61.2<H>  ----------------------------<  83  6.4<HH>   |  13.0<L>  |  5.30<H>    Ca    8.3<L>      15 Sep 2023 17:31  Phos  6.4     09-15  Mg     2.4     09-15    TPro  6.4<L>  /  Alb  2.0<L>  /  TBili  3.4<H>  /  DBili  x   /  AST  87<H>  /  ALT  33  /  AlkPhos  85  09-15    PT/INR - ( 15 Sep 2023 16:25 )   PT: 19.3 sec;   INR: 1.77 ratio         PTT - ( 15 Sep 2023 16:25 )  PTT:41.1 sec  Urinalysis Basic - ( 15 Sep 2023 17:31 )    Color: x / Appearance: x / SG: x / pH: x  Gluc: 83 mg/dL / Ketone: x  / Bili: x / Urobili: x   Blood: x / Protein: x / Nitrite: x   Leuk Esterase: x / RBC: x / WBC x   Sq Epi: x / Non Sq Epi: x / Bacteria: x      Phosphorus: 6.4 mg/dL (09-15 @ 17:31)  Magnesium: 2.4 mg/dL (09-15 @ 16:25)      RADIOLOGY & ADDITIONAL TESTS: Reviewed   HPI: 68M NH resident h/o HTN, hypothyroid, alcoholic cirrhosis and CKD, p/w AMS. Ability to obtain history is limited by pt's poor mental status/somnolence. Per report, pt fell yesterday at NH and subsequently was noted to have increasing creatinine. NH was concerned for rhabdomylysis, prompting transport to [facility]. On arrival, pt is noted to be hypothermic, acidotic, and hyperkalemic, with Cr 5.05, and sinus bradycardia to 40s bpm.  Nephrology is consulted for evaluation of RRT needs.      PAST MEDICAL & SURGICAL HISTORY: Unable to obtain secondary to patient current clinical condition.     FAMILY HISTORY: Unable to obtain secondary to patient current clinical condition.     SOCIAL HISTORY: NHR, Unable to obtain secondary to patient current clinical condition.     MEDICATIONS  (STANDING):  albumin human 25% IVPB 50 milliLiter(s) IV Intermittent Once  DOPamine Infusion 2 MICROgram(s)/kG/Min (8.3 mL/Hr) IV Continuous <Continuous>  EPINEPHrine    Infusion 0.03 MICROgram(s)/kG/Min (12.5 mL/Hr) IV Continuous <Continuous>    ALLERGIES:  doxycycline (Other (Mild to Mod))    REVIEW OF SYSTEMS: Unable to obtain secondary to patient current clinical condition.     Vital Signs Last 24 Hrs  T(C): 32.5 (15 Sep 2023 18:25), Max: 36.7 (15 Sep 2023 13:46)  T(F): 90.5 (15 Sep 2023 18:25), Max: 98 (15 Sep 2023 13:46)  HR: 68 (15 Sep 2023 18:25) (42 - 68)  BP: 105/51 (15 Sep 2023 18:25) (89/42 - 105/51)  RR: 20 (15 Sep 2023 18:25) (17 - 20)  SpO2: 100% (15 Sep 2023 18:25) (99% - 100%)    Parameters below as of 15 Sep 2023 18:25  Patient On (Oxygen Delivery Method): room air        PHYSICAL EXAM:  GENERAL: NAD, well-groomed, well-developed  HEAD:  Atraumatic, Normocephalic  EYES: EOMI, PERRLA, conjunctiva and sclera clear  ENMT: No tonsillar erythema, exudates, or enlargement; Moist mucous membranes, Good dentition, No lesions  NECK: Supple, No JVD, Normal thyroid  NERVOUS SYSTEM:  Alert & Oriented X3, Good concentration; Motor Strength 5/5 B/L upper and lower extremities; DTRs 2+ intact and symmetric  CHEST/LUNG: Clear to percussion bilaterally; No rales, rhonchi, wheezing, or rubs  HEART: Regular rate and rhythm; No murmurs, rubs, or gallops  ABDOMEN: Soft, Nontender, Nondistended; Bowel sounds present  EXTREMITIES:  2+ Peripheral Pulses, No clubbing, cyanosis, or edema  LYMPH: No lymphadenopathy noted  SKIN: No rashes or lesions    LABS:                        10.0   6.54  )-----------( 123      ( 15 Sep 2023 16:25 )             27.2     09-15    133<L>  |  102  |  61.2<H>  ----------------------------<  83  6.4<HH>   |  13.0<L>  |  5.30<H>    Ca    8.3<L>      15 Sep 2023 17:31  Phos  6.4     09-15  Mg     2.4     09-15    TPro  6.4<L>  /  Alb  2.0<L>  /  TBili  3.4<H>  /  DBili  x   /  AST  87<H>  /  ALT  33  /  AlkPhos  85  09-15    PT/INR - ( 15 Sep 2023 16:25 )   PT: 19.3 sec;   INR: 1.77 ratio         PTT - ( 15 Sep 2023 16:25 )  PTT:41.1 sec  Urinalysis Basic - ( 15 Sep 2023 17:31 )    Color: x / Appearance: x / SG: x / pH: x  Gluc: 83 mg/dL / Ketone: x  / Bili: x / Urobili: x   Blood: x / Protein: x / Nitrite: x   Leuk Esterase: x / RBC: x / WBC x   Sq Epi: x / Non Sq Epi: x / Bacteria: x      Phosphorus: 6.4 mg/dL (09-15 @ 17:31)  Magnesium: 2.4 mg/dL (09-15 @ 16:25)      RADIOLOGY & ADDITIONAL TESTS: Reviewed   HPI: 68M NH resident h/o HTN, hypothyroid, alcoholic cirrhosis and CKD, p/w AMS. Ability to obtain history is limited by pt's poor mental status/somnolence. Per report, pt fell yesterday at NH and subsequently was noted to have increasing creatinine. NH was concerned for rhabdomylysis, prompting transport to [facility]. On arrival, pt is noted to be hypothermic, acidotic, and hyperkalemic, with Cr 5.05, and sinus bradycardia to 40s bpm.  Nephrology is consulted for evaluation of RRT needs.      PAST MEDICAL & SURGICAL HISTORY: Unable to obtain secondary to patient current clinical condition.     FAMILY HISTORY: Unable to obtain secondary to patient current clinical condition.     SOCIAL HISTORY: NHR, Unable to obtain secondary to patient current clinical condition.     MEDICATIONS  (STANDING):  albumin human 25% IVPB 50 milliLiter(s) IV Intermittent Once  DOPamine Infusion 2 MICROgram(s)/kG/Min (8.3 mL/Hr) IV Continuous <Continuous>  EPINEPHrine    Infusion 0.03 MICROgram(s)/kG/Min (12.5 mL/Hr) IV Continuous <Continuous>    ALLERGIES:  doxycycline (Other (Mild to Mod))    REVIEW OF SYSTEMS: Unable to obtain secondary to patient current clinical condition.     Vital Signs Last 24 Hrs  T(C): 32.5 (15 Sep 2023 18:25), Max: 36.7 (15 Sep 2023 13:46)  T(F): 90.5 (15 Sep 2023 18:25), Max: 98 (15 Sep 2023 13:46)  HR: 68 (15 Sep 2023 18:25) (42 - 68)  BP: 105/51 (15 Sep 2023 18:25) (89/42 - 105/51)  RR: 20 (15 Sep 2023 18:25) (17 - 20)  SpO2: 100% (15 Sep 2023 18:25) (99% - 100%)    Parameters below as of 15 Sep 2023 18:25  Patient On (Oxygen Delivery Method): room air        PHYSICAL EXAM:  GENERAL: NAD, well-groomed, well-developed  HEAD:  Atraumatic, Normocephalic  EYES: EOMI, PERRLA, conjunctiva and sclera clear  ENMT: No tonsillar erythema, exudates, or enlargement; Moist mucous membranes, Good dentition, No lesions  NECK: Supple, No JVD, Normal thyroid  NERVOUS SYSTEM:  Alert & Oriented X3, Good concentration; Motor Strength 5/5 B/L upper and lower extremities; DTRs 2+ intact and symmetric  CHEST/LUNG: Clear to percussion bilaterally; No rales, rhonchi, wheezing, or rubs  HEART: Regular rate and rhythm; No murmurs, rubs, or gallops  ABDOMEN: Soft, Nontender, Nondistended; Bowel sounds present  EXTREMITIES:  2+ Peripheral Pulses, No clubbing, cyanosis, or edema  LYMPH: No lymphadenopathy noted  SKIN: No rashes or lesions    LABS:                        10.0   6.54  )-----------( 123      ( 15 Sep 2023 16:25 )             27.2     09-15    133<L>  |  102  |  61.2<H>  ----------------------------<  83  6.4<HH>   |  13.0<L>  |  5.30<H>    Ca    8.3<L>      15 Sep 2023 17:31  Phos  6.4     09-15  Mg     2.4     09-15    TPro  6.4<L>  /  Alb  2.0<L>  /  TBili  3.4<H>  /  DBili  x   /  AST  87<H>  /  ALT  33  /  AlkPhos  85  09-15    PT/INR - ( 15 Sep 2023 16:25 )   PT: 19.3 sec;   INR: 1.77 ratio         PTT - ( 15 Sep 2023 16:25 )  PTT:41.1 sec  Urinalysis Basic - ( 15 Sep 2023 17:31 )    Color: x / Appearance: x / SG: x / pH: x  Gluc: 83 mg/dL / Ketone: x  / Bili: x / Urobili: x   Blood: x / Protein: x / Nitrite: x   Leuk Esterase: x / RBC: x / WBC x   Sq Epi: x / Non Sq Epi: x / Bacteria: x      Phosphorus: 6.4 mg/dL (09-15 @ 17:31)  Magnesium: 2.4 mg/dL (09-15 @ 16:25)      RADIOLOGY & ADDITIONAL TESTS: Reviewed   HPI: 68M NH resident h/o HTN, hypothyroid, alcoholic cirrhosis and CKD, p/w AMS. Ability to obtain history is limited by pt's poor mental status/somnolence. Per report, pt fell yesterday at NH and subsequently was noted to have increasing creatinine. NH was concerned for rhabdomyolysis prompting transport to [facility]. On arrival, pt is noted to be hypothermic, acidotic, and hyperkalemic, with Cr 5.05, and sinus bradycardia to 40s bpm.  Nephrology is consulted for evaluation of RRT needs.      PAST MEDICAL & SURGICAL HISTORY: Unable to obtain secondary to patient current clinical condition.     FAMILY HISTORY: Unable to obtain secondary to patient current clinical condition.     SOCIAL HISTORY: NHR, Unable to obtain secondary to patient current clinical condition.     MEDICATIONS  (STANDING):  albumin human 25% IVPB 50 milliLiter(s) IV Intermittent Once  DOPamine Infusion 2 MICROgram(s)/kG/Min (8.3 mL/Hr) IV Continuous <Continuous>  EPINEPHrine    Infusion 0.03 MICROgram(s)/kG/Min (12.5 mL/Hr) IV Continuous <Continuous>    ALLERGIES:  doxycycline (Other (Mild to Mod))    REVIEW OF SYSTEMS: Unable to obtain secondary to patient current clinical condition.     Vital Signs Last 24 Hrs  T(C): 32.5 (15 Sep 2023 18:25), Max: 36.7 (15 Sep 2023 13:46)  T(F): 90.5 (15 Sep 2023 18:25), Max: 98 (15 Sep 2023 13:46)  HR: 68 (15 Sep 2023 18:25) (42 - 68)  BP: 105/51 (15 Sep 2023 18:25) (89/42 - 105/51)  RR: 20 (15 Sep 2023 18:25) (17 - 20)  SpO2: 100% (15 Sep 2023 18:25) (99% - 100%)    Parameters below as of 15 Sep 2023 18:25  Patient On (Oxygen Delivery Method): room air    PHYSICAL EXAM:  Gen: ill appearing, lethargic, Anthony Hugger+  MS: lethargic, not answering appropriately   Eyes: + icterus  HENT: NCAT, MMM  CV: rhythm reg reg, rate santo, no m/g/r, trace LE edema  Chest: Coarse BS  Abd: soft, ND  Skin: dry, warm, no rash or jaundice  Mendez+    LABS:                        10.0   6.54  )-----------( 123      ( 15 Sep 2023 16:25 )             27.2     09-15    133<L>  |  102  |  61.2<H>  ----------------------------<  83  6.4<HH>   |  13.0<L>  |  5.30<H>    Ca    8.3<L>      15 Sep 2023 17:31  Phos  6.4     09-15  Mg     2.4     09-15    TPro  6.4<L>  /  Alb  2.0<L>  /  TBili  3.4<H>  /  DBili  x   /  AST  87<H>  /  ALT  33  /  AlkPhos  85  09-15  PT/INR - ( 15 Sep 2023 16:25 )   PT: 19.3 sec;   INR: 1.77 ratio     PTT - ( 15 Sep 2023 16:25 )  PTT:41.1 sec    Urinalysis Basic - ( 15 Sep 2023 17:31 )    Color: x / Appearance: x / SG: x / pH: x  Gluc: 83 mg/dL / Ketone: x  / Bili: x / Urobili: x   Blood: x / Protein: x / Nitrite: x   Leuk Esterase: x / RBC: x / WBC x   Sq Epi: x / Non Sq Epi: x / Bacteria: x    RADIOLOGY & ADDITIONAL TESTS: Reviewed   HPI: 68M NH resident h/o HTN, hypothyroid, alcoholic cirrhosis and CKD, p/w AMS. Ability to obtain history is limited by pt's poor mental status/somnolence. Per report, pt fell yesterday at NH and subsequently was noted to have increasing creatinine. NH was concerned for rhabdomyolysis prompting transport to [facility]. On arrival, pt is noted to be hypothermic, acidotic, and hyperkalemic, with Cr 5.05, and sinus bradycardia to 40s bpm.  Nephrology is consulted for evaluation of RRT needs.      PAST MEDICAL & SURGICAL HISTORY: Unable to obtain secondary to patient current clinical condition.     FAMILY HISTORY: Unable to obtain secondary to patient current clinical condition.     SOCIAL HISTORY: NHR, Unable to obtain secondary to patient current clinical condition.     MEDICATIONS  (STANDING):  albumin human 25% IVPB 50 milliLiter(s) IV Intermittent Once  DOPamine Infusion 2 MICROgram(s)/kG/Min (8.3 mL/Hr) IV Continuous <Continuous>  EPINEPHrine    Infusion 0.03 MICROgram(s)/kG/Min (12.5 mL/Hr) IV Continuous <Continuous>    ALLERGIES:  doxycycline (Other (Mild to Mod))    REVIEW OF SYSTEMS: Unable to obtain secondary to patient current clinical condition.     Vital Signs Last 24 Hrs  T(C): 32.5 (15 Sep 2023 18:25), Max: 36.7 (15 Sep 2023 13:46)  T(F): 90.5 (15 Sep 2023 18:25), Max: 98 (15 Sep 2023 13:46)  HR: 68 (15 Sep 2023 18:25) (42 - 68)  BP: 105/51 (15 Sep 2023 18:25) (89/42 - 105/51)  RR: 20 (15 Sep 2023 18:25) (17 - 20)  SpO2: 100% (15 Sep 2023 18:25) (99% - 100%)    Parameters below as of 15 Sep 2023 18:25  Patient On (Oxygen Delivery Method): room air    PHYSICAL EXAM:  Gen: ill appearing, lethargic, Anthony Hugger+  MS: lethargic, not answering appropriately   Eyes: + icterus  HENT: NCAT, MMM  CV: rhythm reg reg, rate santo, no m/g/r, trace LE edema  Chest: Coarse BS  Abd: soft, ND  Skin: dry, warm, no rash or jaundice  Mendez+    LABS:                        10.0   6.54  )-----------( 123      ( 15 Sep 2023 16:25 )             27.2     09-15    133<L>  |  102  |  61.2<H>  ----------------------------<  83  6.4<HH>   |  13.0<L>  |  5.30<H>    Ca    8.3<L>      15 Sep 2023 17:31  Phos  6.4     09-15  Mg     2.4     09-15    TPro  6.4<L>  /  Alb  2.0<L>  /  TBili  3.4<H>  /  DBili  x   /  AST  87<H>  /  ALT  33  /  AlkPhos  85  09-15  PT/INR - ( 15 Sep 2023 16:25 )   PT: 19.3 sec;   INR: 1.77 ratio     PTT - ( 15 Sep 2023 16:25 )  PTT:41.1 sec    Urinalysis Basic - ( 15 Sep 2023 17:31 )    Color: x / Appearance: x / SG: x / pH: x  Gluc: 83 mg/dL / Ketone: x  / Bili: x / Urobili: x   Blood: x / Protein: x / Nitrite: x   Leuk Esterase: x / RBC: x / WBC x   Sq Epi: x / Non Sq Epi: x / Bacteria: x    RADIOLOGY & ADDITIONAL TESTS: Reviewed   HPI: 68M NH resident h/o HTN, hypothyroid, alcoholic cirrhosis and CKD, p/w AMS. Ability to obtain history is limited by pt's poor mental status/somnolence. Per report, pt fell yesterday at NH and subsequently was noted to have increasing creatinine. NH was concerned for rhabdomyolysis prompting transport to [facility]. On arrival, pt is noted to be hypothermic, acidotic, and hyperkalemic, with Cr 5.05, and sinus bradycardia to 40s bpm.  Nephrology is consulted for evaluation of RRT needs.      PAST MEDICAL & SURGICAL HISTORY: Unable to obtain secondary to patient current clinical condition.     FAMILY HISTORY: Unable to obtain secondary to patient current clinical condition.     SOCIAL HISTORY: NHR, Unable to obtain secondary to patient current clinical condition.     MEDICATIONS  (STANDING):  albumin human 25% IVPB 50 milliLiter(s) IV Intermittent Once  DOPamine Infusion 2 MICROgram(s)/kG/Min (8.3 mL/Hr) IV Continuous <Continuous>  EPINEPHrine    Infusion 0.03 MICROgram(s)/kG/Min (12.5 mL/Hr) IV Continuous <Continuous>    ALLERGIES:  doxycycline (Other (Mild to Mod))    REVIEW OF SYSTEMS: Unable to obtain secondary to patient current clinical condition.     Vital Signs Last 24 Hrs  T(C): 32.5 (15 Sep 2023 18:25), Max: 36.7 (15 Sep 2023 13:46)  T(F): 90.5 (15 Sep 2023 18:25), Max: 98 (15 Sep 2023 13:46)  HR: 68 (15 Sep 2023 18:25) (42 - 68)  BP: 105/51 (15 Sep 2023 18:25) (89/42 - 105/51)  RR: 20 (15 Sep 2023 18:25) (17 - 20)  SpO2: 100% (15 Sep 2023 18:25) (99% - 100%)    Parameters below as of 15 Sep 2023 18:25  Patient On (Oxygen Delivery Method): room air    PHYSICAL EXAM:  Gen: ill appearing, lethargic, Anthony Hugger+  MS: lethargic, not answering appropriately   Eyes: + icterus  HENT: NCAT, MMM  CV: rhythm reg reg, rate santo, no m/g/r, trace LE edema  Chest: Coarse BS  Abd: soft, ND  Skin: dry, warm, no rash or jaundice  Mendez+    LABS:                        10.0   6.54  )-----------( 123      ( 15 Sep 2023 16:25 )             27.2     09-15    133<L>  |  102  |  61.2<H>  ----------------------------<  83  6.4<HH>   |  13.0<L>  |  5.30<H>    Ca    8.3<L>      15 Sep 2023 17:31  Phos  6.4     09-15  Mg     2.4     09-15    TPro  6.4<L>  /  Alb  2.0<L>  /  TBili  3.4<H>  /  DBili  x   /  AST  87<H>  /  ALT  33  /  AlkPhos  85  09-15  PT/INR - ( 15 Sep 2023 16:25 )   PT: 19.3 sec;   INR: 1.77 ratio     PTT - ( 15 Sep 2023 16:25 )  PTT:41.1 sec    Urinalysis Basic - ( 15 Sep 2023 17:31 )    Color: x / Appearance: x / SG: x / pH: x  Gluc: 83 mg/dL / Ketone: x  / Bili: x / Urobili: x   Blood: x / Protein: x / Nitrite: x   Leuk Esterase: x / RBC: x / WBC x   Sq Epi: x / Non Sq Epi: x / Bacteria: x    RADIOLOGY & ADDITIONAL TESTS: Reviewed

## 2023-09-15 NOTE — CONSULT NOTE ADULT - PROBLEM SELECTOR RECOMMENDATION 4
Patient hypotension on admission started on pressors and inotrope.  - ICU admission and management  - monitor daily labs, ECG and TTE Pen    case d/w Dr. Liu Patient hypotension on admission started on pressors and inotrope.  - ICU admission and management  - monitor daily labs, ECG and TTE Pen    case d/w Dr. Kumar

## 2023-09-15 NOTE — CONSULT NOTE ADULT - NS ATTEND AMEND GEN_ALL_CORE FT
68M hx  HTN, Hypothyroidism, Alcoholic cirrhosis, CKD stage V admitted after acute mental status changes, worsening renal failure, sinus bradycardia with prolonged QTc interval and low voltage on his EKG (on Metoprolol 100mg daily in NH).  Etiology likely due to hyperkalemia, anion gap metabolic acidosis and sepsis (Lactate 3.1).  Also patient found to be hypotensive with systolic blood pressure in the 60s started on Levophed and Dopamine gtts admitted to ICU.  Cardiology called for fluid overload, likely due to underlying cardiomyopathy.  Patient w/ MOLST from NH.       1) Heart failure with acute decompensation, type unknown:   Recommendation: Admit to ICU in light of hypotension, follow daily CBC, BMP, Trend Ce x3, pro-BNP 4800,  hold diuretics since patient hypotensive on admission, now on Levophed and Dopamine gtts   hold Metoprolol  CXR mild edema/congestion,  TTE Pen  on low dose inotropic support currently.    2) Sinus bradycardia: On admission bradycardic, likely due to metabolic issues like hyperkalemia and acidosis.  also on Metoprolol at home    3) KIM/CKD: Unknown Sebaseline Cr (on admission 5.05 and now 5.3 mg/dl)   patient w/ hyperkalemia, metabolic acidosis and anuria, placement of temp HD catheter and CRRT recommended by Nephrology.

## 2023-09-15 NOTE — ED ADULT TRIAGE NOTE - BP NONINVASIVE DIASTOLIC (MM HG)
How Severe Are Your Spot(S)?: moderate What Type Of Note Output Would You Prefer (Optional)?: Bullet Format What Is The Reason For Today's Visit?: Full Body Skin Examination What Is The Reason For Today's Visit? (Being Monitored For X): concerning skin lesions on an annual basis Additional History: Pain 0/10 48

## 2023-09-15 NOTE — ED PROVIDER NOTE - CARE PLAN
1 Principal Discharge DX:	Bradycardia  Secondary Diagnosis:	Hypotension  Secondary Diagnosis:	KIM (acute kidney injury)   Principal Discharge DX:	Bradycardia  Secondary Diagnosis:	Hypotension  Secondary Diagnosis:	KIM (acute kidney injury)  Secondary Diagnosis:	AV junctional rhythm

## 2023-09-15 NOTE — H&P ADULT - NSHPPHYSICALEXAM_GEN_ALL_CORE
Neuro: Confused, lethargic, minimally responsive.   HEENT: PERRL  Resp: Good air entry b/l  CVS: Bradycardia, junctional rhythm   Abd: BSx4, soft, nt/nd  Ext: no edema  Skin: warn/dry

## 2023-09-15 NOTE — ED PROVIDER NOTE - CLINICAL SUMMARY MEDICAL DECISION MAKING FREE TEXT BOX
68M hx of CKD found to have KIM at RN home; also with a fall yesterday that was witnessed, on exam here with BLE in ace wraps, triage vitals with bradycardia and on exam borderline bradycardic. I called the [place] RN facility and they discussed patient with me; given significant KIM and deconditioning likely will require admission, will check labs and CT head, also check cspine and non-cont abdomen given fall and KIM; follow up studies, reassess, dispo. 68M hx of CKD found to have KIM at RN home; also with a fall yesterday that was witnessed, on exam here with BLE in ace wraps, triage vitals with bradycardia and on exam borderline bradycardic. I called the [place] RN facility and they discussed patient with me; given significant KIM and deconditioning likely will require admission, will check labs and CT head, also check cspine and non-cont abdomen given fall and KIM; follow up studies, reassess, dispo. 68M hx of CKD found to have KIM at RN home; also with a fall yesterday that was witnessed, on exam here with BLE in ace wraps, triage vitals with bradycardia and on exam borderline bradycardic. I called the [place] RN facility and they discussed patient with me; given significant KIM and deconditioning likely will require admission, will check labs and CT head, also check cspine and non-cont abdomen given fall and KIM; follow up studies, reassess, dispo. 68M hx of CKD found to have KIM at RN home; also with a fall yesterday that was witnessed, on exam here with BLE in ace wraps, triage vitals with bradycardia and on exam borderline bradycardic. I called the [place] RN facility and they discussed patient with me; given significant KIM and deconditioning likely will require admission, will check labs and CT head, also check cspine and non-cont abdomen given fall and KIM; follow up studies, reassess, dispo.    Update: Dr. Cabrera down to ED with recs for treating potential BB overdose 2/2 KIM causing increased amount in pt's body; started on glucagon and a push of atropine, recommends transition to epi and dopa instead of levophed. Upon pushing atropine patient's HR went from 41 -> 50; after glucagon approx 3 minutes later patient's HR improved to mid 60s which is the best it has been; repeat EKG now pending. BP has started to improve as well; patient's mentation continues to be borderline hypotensive to hypotensive. After stabilizing on gtts had family members come see patient; the patient's sister able to note he is quite swollen compared to his baseline, notes he had appeared to get this way over the past day and a half as she sees him regularly at the [facility].     Update: nephro rec dialysis; patient has received micu bed, d/w MICU PA I have been searching for a dialysis catheter to place, patient did receive a spot upstairs MICU notes will place catheter in unit. 68M hx of CKD found to have KIM at RN home; also with a fall yesterday that was witnessed, on exam here with BLE in ace wraps, triage vitals with bradycardia and on exam borderline bradycardic. I called the [place] RN facility and they discussed patient with me; given significant KIM and deconditioning likely will require admission, will check labs and CT head, also check cspine and non-cont abdomen given fall and KIM; follow up studies, reassess, dispo.    Update: Dr. Cabrera down to ED with recs for treating potential BB overdose 2/2 KIM causing increased amount in pt's body; started on glucagon and a push of atropine, recommends transition to epi and dopa instead of levophed. Upon pushing atropine patient's HR went from 41 -> 50; after glucagon approx 3 minutes later patient's HR improved to mid 60s which is the best it has been; repeat EKG now pending. BP has started to improve as well; patient's mentation continues to be borderline hypotensive to hypotensive. After stabilizing on gtts had family members come see patient; the patient's sister able to note he is quite swollen compared to his baseline, notes he had appeared to get this way over the past day and a half as she sees him regularly at the [facility].     Update: nephro rec dialysis; patient has received micu bed, d/w MICU PA I have been searching for a dialysis catheter to place, patient did receive a spot upstairs MICU notes will place catheter in unit. 68M hx of CKD found to have KIM at RN home; also with a fall yesterday that was witnessed, on exam here with BLE in ace wraps, triage vitals with bradycardia and on exam borderline bradycardic. I called the [place] RN facility and they discussed patient with me; given significant KIM and deconditioning likely will require admission, will check labs and CT head, also check cspine and non-cont abdomen given fall and KIM; follow up studies, reassess, dispo.    Update: Dr. Cabrera down to ED with recs for treating potential BB overdose 2/2 KIM causing increased amount in pt's body; started on glucagon and a push of atropine, recommends transition to epi and dopa instead of levophed. Upon pushing atropine patient's HR went from 41 -> 50; after glucagon approx 3 minutes later patient's HR improved to mid 60s which is the best it has been; repeat EKG now pending. BP has started to improve as well; patient's mentation continues to be borderline hypotensive to hypotensive. After stabilizing on gtts had family members come see patient; the patient's sister able to note he is quite swollen compared to his baseline, notes he had appeared to get this way over the past day and a half as she sees him regularly at the [facility].     Update: nephro rec dialysis; patient has received micu bed, d/w MICU PA I have been searching for a dialysis catheter to place, patient did receive a spot upstairs MICU notes will place catheter in unit.

## 2023-09-15 NOTE — ED ADULT NURSE NOTE - OBJECTIVE STATEMENT
Pt care acquired 1600. Pt is A&Ox1 and is lethargic, trying to crawl out of bed. On arrival, pt is hypothermic, hypotensive, and bradycardic. MD isiah johnson called to bedside, EKG done with HR of 42. PT has pitting edema in extremities, edema in scrotum. Pt is diaphoretic on assessment. Pt is breathing unlabored on room air. Crackling noted on ausculation bilateral lungs. VSS.

## 2023-09-16 LAB
ALBUMIN SERPL ELPH-MCNC: 2.1 G/DL — LOW (ref 3.3–5.2)
ALBUMIN SERPL ELPH-MCNC: 2.3 G/DL — LOW (ref 3.3–5.2)
ALP SERPL-CCNC: 83 U/L — SIGNIFICANT CHANGE UP (ref 40–120)
ALP SERPL-CCNC: 84 U/L — SIGNIFICANT CHANGE UP (ref 40–120)
ALP SERPL-CCNC: 86 U/L — SIGNIFICANT CHANGE UP (ref 40–120)
ALT FLD-CCNC: 27 U/L — SIGNIFICANT CHANGE UP
ALT FLD-CCNC: 29 U/L — SIGNIFICANT CHANGE UP
AMMONIA BLD-MCNC: 119 UMOL/L — HIGH (ref 11–55)
ANION GAP SERPL CALC-SCNC: 14 MMOL/L — SIGNIFICANT CHANGE UP (ref 5–17)
ANION GAP SERPL CALC-SCNC: 15 MMOL/L — SIGNIFICANT CHANGE UP (ref 5–17)
ANION GAP SERPL CALC-SCNC: 16 MMOL/L — SIGNIFICANT CHANGE UP (ref 5–17)
ANION GAP SERPL CALC-SCNC: 18 MMOL/L — HIGH (ref 5–17)
APTT BLD: 46.5 SEC — HIGH (ref 24.5–35.6)
AST SERPL-CCNC: 62 U/L — HIGH
AST SERPL-CCNC: 66 U/L — HIGH
BILIRUB SERPL-MCNC: 3.2 MG/DL — HIGH (ref 0.4–2)
BILIRUB SERPL-MCNC: 3.4 MG/DL — HIGH (ref 0.4–2)
BUN SERPL-MCNC: 37.9 MG/DL — HIGH (ref 8–20)
BUN SERPL-MCNC: 43.9 MG/DL — HIGH (ref 8–20)
BUN SERPL-MCNC: 50.6 MG/DL — HIGH (ref 8–20)
BUN SERPL-MCNC: 57.5 MG/DL — HIGH (ref 8–20)
CALCIUM SERPL-MCNC: 7.5 MG/DL — LOW (ref 8.4–10.5)
CALCIUM SERPL-MCNC: 7.8 MG/DL — LOW (ref 8.4–10.5)
CALCIUM SERPL-MCNC: 8.2 MG/DL — LOW (ref 8.4–10.5)
CALCIUM SERPL-MCNC: 8.4 MG/DL — SIGNIFICANT CHANGE UP (ref 8.4–10.5)
CHLORIDE SERPL-SCNC: 100 MMOL/L — SIGNIFICANT CHANGE UP (ref 96–108)
CHLORIDE SERPL-SCNC: 101 MMOL/L — SIGNIFICANT CHANGE UP (ref 96–108)
CHLORIDE SERPL-SCNC: 98 MMOL/L — SIGNIFICANT CHANGE UP (ref 96–108)
CO2 SERPL-SCNC: 15 MMOL/L — LOW (ref 22–29)
CO2 SERPL-SCNC: 18 MMOL/L — LOW (ref 22–29)
CO2 SERPL-SCNC: 19 MMOL/L — LOW (ref 22–29)
CREAT SERPL-MCNC: 3.04 MG/DL — HIGH (ref 0.5–1.3)
CREAT SERPL-MCNC: 3.78 MG/DL — HIGH (ref 0.5–1.3)
CREAT SERPL-MCNC: 4.03 MG/DL — HIGH (ref 0.5–1.3)
CREAT SERPL-MCNC: 4.83 MG/DL — HIGH (ref 0.5–1.3)
EGFR: 12 ML/MIN/1.73M2 — LOW
EGFR: 15 ML/MIN/1.73M2 — LOW
EGFR: 17 ML/MIN/1.73M2 — LOW
EGFR: 22 ML/MIN/1.73M2 — LOW
GAS PNL BLDA: SIGNIFICANT CHANGE UP
GLUCOSE SERPL-MCNC: 103 MG/DL — HIGH (ref 70–99)
GLUCOSE SERPL-MCNC: 117 MG/DL — HIGH (ref 70–99)
GLUCOSE SERPL-MCNC: 94 MG/DL — SIGNIFICANT CHANGE UP (ref 70–99)
GLUCOSE SERPL-MCNC: 99 MG/DL — SIGNIFICANT CHANGE UP (ref 70–99)
HCT VFR BLD CALC: 20.5 % — CRITICAL LOW (ref 39–50)
HCT VFR BLD CALC: 23.2 % — LOW (ref 39–50)
HCT VFR BLD CALC: 23.4 % — LOW (ref 39–50)
HCV AB S/CO SERPL IA: 0.12 S/CO — SIGNIFICANT CHANGE UP (ref 0–0.99)
HCV AB SERPL-IMP: SIGNIFICANT CHANGE UP
HGB BLD-MCNC: 8 G/DL — LOW (ref 13–17)
HGB BLD-MCNC: 8.3 G/DL — LOW (ref 13–17)
HGB BLD-MCNC: 8.5 G/DL — LOW (ref 13–17)
INR BLD: 2.09 RATIO — HIGH (ref 0.85–1.18)
LACTATE SERPL-SCNC: 5.2 MMOL/L — CRITICAL HIGH (ref 0.5–2)
MAGNESIUM SERPL-MCNC: 2.2 MG/DL — SIGNIFICANT CHANGE UP (ref 1.6–2.6)
MAGNESIUM SERPL-MCNC: 2.3 MG/DL — SIGNIFICANT CHANGE UP (ref 1.6–2.6)
MCHC RBC-ENTMCNC: 32.8 PG — SIGNIFICANT CHANGE UP (ref 27–34)
MCHC RBC-ENTMCNC: 33.9 PG — SIGNIFICANT CHANGE UP (ref 27–34)
MCHC RBC-ENTMCNC: 35.5 GM/DL — SIGNIFICANT CHANGE UP (ref 32–36)
MCHC RBC-ENTMCNC: 36.6 GM/DL — HIGH (ref 32–36)
MCHC RBC-ENTMCNC: 39 GM/DL — HIGH (ref 32–36)
MCV RBC AUTO: 86.9 FL — SIGNIFICANT CHANGE UP (ref 80–100)
MCV RBC AUTO: 89.6 FL — SIGNIFICANT CHANGE UP (ref 80–100)
MCV RBC AUTO: 92.5 FL — SIGNIFICANT CHANGE UP (ref 80–100)
MRSA PCR RESULT.: SIGNIFICANT CHANGE UP
PHOSPHATE SERPL-MCNC: 4.6 MG/DL — SIGNIFICANT CHANGE UP (ref 2.4–4.7)
PHOSPHATE SERPL-MCNC: 5 MG/DL — HIGH (ref 2.4–4.7)
PHOSPHATE SERPL-MCNC: 5.4 MG/DL — HIGH (ref 2.4–4.7)
PHOSPHATE SERPL-MCNC: 5.9 MG/DL — HIGH (ref 2.4–4.7)
PLATELET # BLD AUTO: 123 K/UL — LOW (ref 150–400)
PLATELET # BLD AUTO: 156 K/UL — SIGNIFICANT CHANGE UP (ref 150–400)
PLATELET # BLD AUTO: 83 K/UL — LOW (ref 150–400)
POTASSIUM SERPL-MCNC: 4.1 MMOL/L — SIGNIFICANT CHANGE UP (ref 3.5–5.3)
POTASSIUM SERPL-MCNC: 4.2 MMOL/L — SIGNIFICANT CHANGE UP (ref 3.5–5.3)
POTASSIUM SERPL-MCNC: 4.5 MMOL/L — SIGNIFICANT CHANGE UP (ref 3.5–5.3)
POTASSIUM SERPL-MCNC: 4.7 MMOL/L — SIGNIFICANT CHANGE UP (ref 3.5–5.3)
POTASSIUM SERPL-SCNC: 4.1 MMOL/L — SIGNIFICANT CHANGE UP (ref 3.5–5.3)
POTASSIUM SERPL-SCNC: 4.2 MMOL/L — SIGNIFICANT CHANGE UP (ref 3.5–5.3)
POTASSIUM SERPL-SCNC: 4.5 MMOL/L — SIGNIFICANT CHANGE UP (ref 3.5–5.3)
POTASSIUM SERPL-SCNC: 4.7 MMOL/L — SIGNIFICANT CHANGE UP (ref 3.5–5.3)
PROT SERPL-MCNC: 5.8 G/DL — LOW (ref 6.6–8.7)
PROT SERPL-MCNC: 6.2 G/DL — LOW (ref 6.6–8.7)
PROT SERPL-MCNC: 6.3 G/DL — LOW (ref 6.6–8.7)
PROTHROM AB SERPL-ACNC: 22.7 SEC — HIGH (ref 9.5–13)
RBC # BLD: 2.36 M/UL — LOW (ref 4.2–5.8)
RBC # BLD: 2.53 M/UL — LOW (ref 4.2–5.8)
RBC # BLD: 2.59 M/UL — LOW (ref 4.2–5.8)
RBC # FLD: 17.3 % — HIGH (ref 10.3–14.5)
RBC # FLD: 17.9 % — HIGH (ref 10.3–14.5)
RBC # FLD: 18.4 % — HIGH (ref 10.3–14.5)
S AUREUS DNA NOSE QL NAA+PROBE: DETECTED
SODIUM SERPL-SCNC: 132 MMOL/L — LOW (ref 135–145)
SODIUM SERPL-SCNC: 134 MMOL/L — LOW (ref 135–145)
TROPONIN T SERPL-MCNC: 0.16 NG/ML — HIGH (ref 0–0.06)
TSH SERPL-MCNC: 7.76 UIU/ML — HIGH (ref 0.27–4.2)
VANCOMYCIN FLD-MCNC: 9.2 UG/ML — SIGNIFICANT CHANGE UP
WBC # BLD: 10 K/UL — SIGNIFICANT CHANGE UP (ref 3.8–10.5)
WBC # BLD: 8.76 K/UL — SIGNIFICANT CHANGE UP (ref 3.8–10.5)
WBC # BLD: 9.56 K/UL — SIGNIFICANT CHANGE UP (ref 3.8–10.5)
WBC # FLD AUTO: 10 K/UL — SIGNIFICANT CHANGE UP (ref 3.8–10.5)
WBC # FLD AUTO: 8.76 K/UL — SIGNIFICANT CHANGE UP (ref 3.8–10.5)
WBC # FLD AUTO: 9.56 K/UL — SIGNIFICANT CHANGE UP (ref 3.8–10.5)

## 2023-09-16 PROCEDURE — 90947 DIALYSIS REPEATED EVAL: CPT

## 2023-09-16 PROCEDURE — 99291 CRITICAL CARE FIRST HOUR: CPT | Mod: GC

## 2023-09-16 PROCEDURE — 71045 X-RAY EXAM CHEST 1 VIEW: CPT | Mod: 26

## 2023-09-16 PROCEDURE — 76775 US EXAM ABDO BACK WALL LIM: CPT | Mod: 26

## 2023-09-16 RX ORDER — TRANEXAMIC ACID 100 MG/ML
500 INJECTION, SOLUTION INTRAVENOUS EVERY 8 HOURS
Refills: 0 | Status: DISCONTINUED | OUTPATIENT
Start: 2023-09-16 | End: 2023-09-16

## 2023-09-16 RX ORDER — ONDANSETRON 8 MG/1
4 TABLET, FILM COATED ORAL EVERY 6 HOURS
Refills: 0 | Status: DISCONTINUED | OUTPATIENT
Start: 2023-09-16 | End: 2023-09-17

## 2023-09-16 RX ORDER — HYDROMORPHONE HYDROCHLORIDE 2 MG/ML
0.25 INJECTION INTRAMUSCULAR; INTRAVENOUS; SUBCUTANEOUS ONCE
Refills: 0 | Status: DISCONTINUED | OUTPATIENT
Start: 2023-09-16 | End: 2023-09-16

## 2023-09-16 RX ORDER — LEVOTHYROXINE SODIUM 125 MCG
44 TABLET ORAL AT BEDTIME
Refills: 0 | Status: DISCONTINUED | OUTPATIENT
Start: 2023-09-16 | End: 2023-09-17

## 2023-09-16 RX ORDER — VASOPRESSIN 20 [USP'U]/ML
0.04 INJECTION INTRAVENOUS
Qty: 40 | Refills: 0 | Status: DISCONTINUED | OUTPATIENT
Start: 2023-09-16 | End: 2023-09-17

## 2023-09-16 RX ORDER — OLANZAPINE 15 MG/1
2.5 TABLET, FILM COATED ORAL ONCE
Refills: 0 | Status: COMPLETED | OUTPATIENT
Start: 2023-09-16 | End: 2023-09-16

## 2023-09-16 RX ORDER — PIPERACILLIN AND TAZOBACTAM 4; .5 G/20ML; G/20ML
3.38 INJECTION, POWDER, LYOPHILIZED, FOR SOLUTION INTRAVENOUS EVERY 8 HOURS
Refills: 0 | Status: DISCONTINUED | OUTPATIENT
Start: 2023-09-16 | End: 2023-09-17

## 2023-09-16 RX ORDER — SODIUM CHLORIDE 9 MG/ML
10 INJECTION INTRAMUSCULAR; INTRAVENOUS; SUBCUTANEOUS
Refills: 0 | Status: DISCONTINUED | OUTPATIENT
Start: 2023-09-16 | End: 2023-09-17

## 2023-09-16 RX ORDER — TRANEXAMIC ACID 100 MG/ML
500 INJECTION, SOLUTION INTRAVENOUS EVERY 8 HOURS
Refills: 0 | Status: DISCONTINUED | OUTPATIENT
Start: 2023-09-16 | End: 2023-09-17

## 2023-09-16 RX ORDER — LACTULOSE 10 G/15ML
200 SOLUTION ORAL EVERY 6 HOURS
Refills: 0 | Status: DISCONTINUED | OUTPATIENT
Start: 2023-09-16 | End: 2023-09-16

## 2023-09-16 RX ORDER — NOREPINEPHRINE BITARTRATE/D5W 8 MG/250ML
0.05 PLASTIC BAG, INJECTION (ML) INTRAVENOUS
Qty: 16 | Refills: 0 | Status: DISCONTINUED | OUTPATIENT
Start: 2023-09-16 | End: 2023-09-17

## 2023-09-16 RX ORDER — THIAMINE MONONITRATE (VIT B1) 100 MG
100 TABLET ORAL DAILY
Refills: 0 | Status: DISCONTINUED | OUTPATIENT
Start: 2023-09-16 | End: 2023-09-17

## 2023-09-16 RX ORDER — OLANZAPINE 15 MG/1
5 TABLET, FILM COATED ORAL EVERY 12 HOURS
Refills: 0 | Status: DISCONTINUED | OUTPATIENT
Start: 2023-09-16 | End: 2023-09-17

## 2023-09-16 RX ORDER — FOLIC ACID 0.8 MG
1 TABLET ORAL DAILY
Refills: 0 | Status: DISCONTINUED | OUTPATIENT
Start: 2023-09-16 | End: 2023-09-17

## 2023-09-16 RX ORDER — IPRATROPIUM/ALBUTEROL SULFATE 18-103MCG
3 AEROSOL WITH ADAPTER (GRAM) INHALATION EVERY 6 HOURS
Refills: 0 | Status: DISCONTINUED | OUTPATIENT
Start: 2023-09-16 | End: 2023-09-17

## 2023-09-16 RX ORDER — CHLORHEXIDINE GLUCONATE 213 G/1000ML
1 SOLUTION TOPICAL
Refills: 0 | Status: DISCONTINUED | OUTPATIENT
Start: 2023-09-16 | End: 2023-09-17

## 2023-09-16 RX ADMIN — LACTULOSE 200 GRAM(S): 10 SOLUTION ORAL at 07:26

## 2023-09-16 RX ADMIN — HEPARIN SODIUM 5000 UNIT(S): 5000 INJECTION INTRAVENOUS; SUBCUTANEOUS at 13:16

## 2023-09-16 RX ADMIN — Medication 100 MEQ/KG/HR: at 02:56

## 2023-09-16 RX ADMIN — PANTOPRAZOLE SODIUM 40 MILLIGRAM(S): 20 TABLET, DELAYED RELEASE ORAL at 17:51

## 2023-09-16 RX ADMIN — Medication 100 MEQ/KG/HR: at 05:19

## 2023-09-16 RX ADMIN — OLANZAPINE 5 MILLIGRAM(S): 15 TABLET, FILM COATED ORAL at 10:56

## 2023-09-16 RX ADMIN — PIPERACILLIN AND TAZOBACTAM 25 GRAM(S): 4; .5 INJECTION, POWDER, LYOPHILIZED, FOR SOLUTION INTRAVENOUS at 17:51

## 2023-09-16 RX ADMIN — Medication 1 MILLIGRAM(S): at 13:16

## 2023-09-16 RX ADMIN — VASOPRESSIN 6 UNIT(S)/MIN: 20 INJECTION INTRAVENOUS at 09:51

## 2023-09-16 RX ADMIN — HYDROMORPHONE HYDROCHLORIDE 0.25 MILLIGRAM(S): 2 INJECTION INTRAMUSCULAR; INTRAVENOUS; SUBCUTANEOUS at 02:00

## 2023-09-16 RX ADMIN — Medication 100 MILLIGRAM(S): at 10:57

## 2023-09-16 RX ADMIN — Medication 44 MICROGRAM(S): at 21:52

## 2023-09-16 RX ADMIN — DOPAMINE HYDROCHLORIDE 8.3 MICROGRAM(S)/KG/MIN: 40 INJECTION, SOLUTION, CONCENTRATE INTRAVENOUS at 05:20

## 2023-09-16 RX ADMIN — DOPAMINE HYDROCHLORIDE 8.3 MICROGRAM(S)/KG/MIN: 40 INJECTION, SOLUTION, CONCENTRATE INTRAVENOUS at 19:59

## 2023-09-16 RX ADMIN — PANTOPRAZOLE SODIUM 40 MILLIGRAM(S): 20 TABLET, DELAYED RELEASE ORAL at 05:19

## 2023-09-16 RX ADMIN — DOPAMINE HYDROCHLORIDE 8.3 MICROGRAM(S)/KG/MIN: 40 INJECTION, SOLUTION, CONCENTRATE INTRAVENOUS at 09:51

## 2023-09-16 RX ADMIN — PIPERACILLIN AND TAZOBACTAM 25 GRAM(S): 4; .5 INJECTION, POWDER, LYOPHILIZED, FOR SOLUTION INTRAVENOUS at 04:14

## 2023-09-16 RX ADMIN — OLANZAPINE 2.5 MILLIGRAM(S): 15 TABLET, FILM COATED ORAL at 02:19

## 2023-09-16 RX ADMIN — ONDANSETRON 4 MILLIGRAM(S): 8 TABLET, FILM COATED ORAL at 21:51

## 2023-09-16 RX ADMIN — TRANEXAMIC ACID 500 MILLIGRAM(S): 100 INJECTION, SOLUTION INTRAVENOUS at 22:17

## 2023-09-16 RX ADMIN — HYDROMORPHONE HYDROCHLORIDE 0.25 MILLIGRAM(S): 2 INJECTION INTRAMUSCULAR; INTRAVENOUS; SUBCUTANEOUS at 01:29

## 2023-09-16 RX ADMIN — CHLORHEXIDINE GLUCONATE 1 APPLICATION(S): 213 SOLUTION TOPICAL at 05:18

## 2023-09-16 RX ADMIN — Medication 10.4 MICROGRAM(S)/KG/MIN: at 00:22

## 2023-09-16 RX ADMIN — Medication 50 MILLILITER(S): at 00:40

## 2023-09-16 RX ADMIN — Medication 5.57 MICROGRAM(S)/KG/MIN: at 19:21

## 2023-09-16 RX ADMIN — Medication 10.4 MICROGRAM(S)/KG/MIN: at 05:19

## 2023-09-16 RX ADMIN — Medication 5.57 MICROGRAM(S)/KG/MIN: at 09:51

## 2023-09-16 RX ADMIN — LACTULOSE 200 GRAM(S): 10 SOLUTION ORAL at 13:17

## 2023-09-16 RX ADMIN — CHLORHEXIDINE GLUCONATE 1 APPLICATION(S): 213 SOLUTION TOPICAL at 05:20

## 2023-09-16 RX ADMIN — PIPERACILLIN AND TAZOBACTAM 25 GRAM(S): 4; .5 INJECTION, POWDER, LYOPHILIZED, FOR SOLUTION INTRAVENOUS at 09:51

## 2023-09-16 RX ADMIN — Medication 3 MILLILITER(S): at 22:18

## 2023-09-16 RX ADMIN — LACTULOSE 200 GRAM(S): 10 SOLUTION ORAL at 19:59

## 2023-09-16 NOTE — CHART NOTE - NSCHARTNOTEFT_GEN_A_CORE
Events overnight noted - now on CRRT.   Tele reviewed - sinus rhythm at 60s - 80s w/ intact conduction; isolated episode of irregular nonsustained WCT x 6 beats yesterday PM.     Suspect sinus santo in ED was metabolic in setting of acidosis, hyperkalemia, hypothermia, hyperthyroid, KIM. Now improved w/ CRRT.     No indication for EP intervention at this time.   Will sign off as to EP. Please call EP service with questions, concerns or further arrhythmia issues.   Further dispo per primary team.
Family brought in patient's living will  Patient did specify that he does not want life sustaining measure such as dialysis and antibiotics  discussed this with the sister, we will honor his wish ultimately.  But at this time, since he is already on the HD, will continue to see if he has any improvement for the next few days.  If patient does not improve and remains on pressors and dialysis dependent, will re-address goals of care, and possible comfort measure.  Family agrees with the plan.

## 2023-09-16 NOTE — PROGRESS NOTE ADULT - ATTENDING COMMENTS
68M with hx of EtOH related cirrhosis, HTN, hypothyroidism, CKD referred to the ED secondary to acute on chronic renal failure noted on labs/ AMS and was being treated for possible UTI and reportedly had fall yesterday. He was found to be hypothermic, bradycardiac on presentation with Cr 5, hyperkalemia to 6 with AGMA and lactate 3. Pt was given atropine in the ED as well as glucagon given metoprolol use outpatient and reported junctional rhythm but was sinus santo when seen. Pt was confused on exam and intermittently agitated pulling at lines. Pt was given albumin and 500cc bolus with minimal UOP and started on epi/dopa for hypotension/bradycardia. CT head without acute pathology. CT cervical spine without acute fractures/dislocations. CT chest/abd/pelvis showed pulm edema, 3mm non-obs R kidney stone. Pt with minimal response to fluids so was started on CVVHD per nephro recs and started on bicarb drip. Pt was transitioned to levo/vaso.   9/16 - dopa off, bicarb off, patient family brought in living will, patient indicated that he would not want dialysis.    Discussed with family, would like to continue trial of critical care for now, if no improvement, will consider comfort measure

## 2023-09-16 NOTE — PROGRESS NOTE ADULT - SUBJECTIVE AND OBJECTIVE BOX
Reason for visit: KIM/CRRT    Subjective/Overnight: Seen on CRRT this AM in ICU, still on dopamine, vaso and levo. NO UOP. lethargic.  HR improved.     ROS: Unable to obtain secondary to patient current clinical condition.     PHYSICAL EXAM:  Gen: ill appearing, lethargic, Anthony Hugger+  MS: lethargic, not answering appropriately   Eyes: + icterus  HENT: NCAT, MMM  CV: rhythm reg reg, rate santo, no m/g/r, + LE edema (wrapped)  Chest: Coarse BS  Abd: soft, ND  Skin: dry, warm, no rash or jaundice  Mendez+    =======================================================  Vital Signs Last 24 Hrs  T(C): 37 (16 Sep 2023 08:00), Max: 37.1 (16 Sep 2023 06:30)  T(F): 98.6 (16 Sep 2023 08:00), Max: 98.8 (16 Sep 2023 06:30)  HR: 81 (16 Sep 2023 10:00) (42 - 110)  BP: 108/89 (16 Sep 2023 01:15) (84/39 - 112/60)  BP(mean): 96 (16 Sep 2023 01:15) (53 - 96)  RR: 24 (16 Sep 2023 10:00) (7 - 26)  SpO2: 99% (16 Sep 2023 10:00) (95% - 100%)    Parameters below as of 16 Sep 2023 08:00  Patient On (Oxygen Delivery Method): nasal cannula  O2 Flow (L/min): 2    I&O's Summary    15 Sep 2023 07:01  -  16 Sep 2023 07:00  --------------------------------------------------------  IN: 1166.8 mL / OUT: 499 mL / NET: 667.8 mL    16 Sep 2023 07:01  -  16 Sep 2023 10:02  --------------------------------------------------------  IN: 332.8 mL / OUT: 360 mL / NET: -27.2 mL      =======================================================  Current Antibiotics:  piperacillin/tazobactam IVPB.. 3.375 Gram(s) IV Intermittent every 8 hours    Other medications:  chlorhexidine 2% Cloths 1 Application(s) Topical <User Schedule>  chlorhexidine 4% Liquid 1 Application(s) Topical <User Schedule>  CRRT Treatment    <Continuous>  DOPamine Infusion 2 MICROgram(s)/kG/Min IV Continuous <Continuous>  EPINEPHrine    Infusion 0.03 MICROgram(s)/kG/Min IV Continuous <Continuous>  folic acid Injectable 1 milliGRAM(s) IV Push daily  heparin   Injectable 5000 Unit(s) SubCutaneous every 8 hours  lactulose Retention Enema 200 Gram(s) Rectal every 6 hours  levothyroxine Injectable 44 MICROGram(s) IV Push at bedtime  norepinephrine Infusion 0.05 MICROgram(s)/kG/Min IV Continuous <Continuous>  pantoprazole  Injectable 40 milliGRAM(s) IV Push two times a day  Phoxillum Filtration BK 4 / 2.5 5000 milliLiter(s) CRRT <Continuous>  Phoxillum Filtration BK 4 / 2.5 5000 milliLiter(s) CRRT <Continuous>  PrismaSOL Filtration BGK 0 / 2.5 5000 milliLiter(s) CRRT <Continuous>  sodium bicarbonate  Infusion 0.136 mEq/kG/Hr IV Continuous <Continuous>  thiamine Injectable 100 milliGRAM(s) IV Push daily  vasopressin Infusion 0.04 Unit(s)/Min IV Continuous <Continuous>    =======================================================  09-16    132<L>  |  100  |  50.6<H>  ----------------------------<  117<H>  4.5   |  18.0<L>  |  4.03<H>    Ca    8.2<L>      16 Sep 2023 08:15  Phos  5.4     09-16  Mg     2.3     09-16    TPro  6.3<L>  /  Alb  2.3<L>  /  TBili  3.4<H>  /  DBili  x   /  AST  66<H>  /  ALT  29  /  AlkPhos  84  09-16    Creatinine: 4.03 mg/dL (09-16-23 @ 08:15)  Creatinine: 4.83 mg/dL (09-16-23 @ 03:00)  Creatinine: 5.30 mg/dL (09-15-23 @ 17:31)  Creatinine: 5.05 mg/dL (09-15-23 @ 16:25)    Urinalysis Basic - ( 16 Sep 2023 08:15 )    Color: x / Appearance: x / SG: x / pH: x  Gluc: 117 mg/dL / Ketone: x  / Bili: x / Urobili: x   Blood: x / Protein: x / Nitrite: x   Leuk Esterase: x / RBC: x / WBC x   Sq Epi: x / Non Sq Epi: x / Bacteria: x      =======================================================

## 2023-09-16 NOTE — PROCEDURE NOTE - ADDITIONAL PROCEDURE DETAILS
Procedural time separate of E/T time.
Procedural time separate of E/T time.   Pt combative during procedure and had to have nursing staff hold Arms down.
Procedural time separate of E/T time

## 2023-09-16 NOTE — PROCEDURE NOTE - NSINDICATIONS_GEN_A_CORE
ARF shock/critical illness/dialysis/CRRT/emergency venous access
altered anatomy/critical patient/strict intake/output during critical illness
ARF, shock/critical illness/dialysis/CRRT/emergency venous access
Shock state/arterial puncture to obtain ABG's/cannulation purposes/critical patient/monitoring purposes

## 2023-09-16 NOTE — PROCEDURE NOTE - NSPROCDETAILS_GEN_ALL_CORE
sterile technique, indwelling urinary device inserted
guidewire recovered/lumen(s) aspirated and flushed/sterile dressing applied/sterile technique, catheter placed/ultrasound guidance with use of sterile gel and probe cove
location identified, draped/prepped, sterile technique used, needle inserted/introduced/positive blood return obtained via catheter/connected to a pressurized flush line/sutured in place/hemostasis with direct pressure, dressing applied/Seldinger technique/all materials/supplies accounted for at end of procedure
guidewire recovered/lumen(s) aspirated and flushed/sterile dressing applied/sterile technique, catheter placed/ultrasound guidance with use of sterile gel and probe cove

## 2023-09-16 NOTE — PROGRESS NOTE ADULT - CRITICAL CARE ATTENDING COMMENT
actively titrating pressors, and actively managing ventilator settings, on cvvh, and very high risk of decomepnsation.

## 2023-09-16 NOTE — PROCEDURE NOTE - NSPROCNAME_GEN_A_CORE
Arterial Puncture/Cannulation
Central Line Insertion
Central Line Insertion
Urinary Device Placement

## 2023-09-16 NOTE — PROGRESS NOTE ADULT - ASSESSMENT
68M NH resident h/o HTN, hypothyroid, alcoholic cirrhosis and CKD, p/w AMS. Ability to obtain history is limited by pt's poor mental status/somnolence. On arrival, pt is noted to be hypothermic, acidotic, and hyperkalemic, with Cr 5.05, and sinus bradycardia to 40s bpm.  Nephrology is consulted for evaluation of RRT needs.      1. Acute kidney injury on CKD, NOS:  -KIM likely hemodynamically mediated in a patient w/ bradycardia, shock    -Baseline SCr: unclear, SCr on admission 5.05 mg/dl, he was started on CRRT  -UA: NA  -Imaging: KIDNEYS/URETERS: No hydronephrosis. A 3 mm nonobstructive stone of the interpolar right kidney. BLADDER: Decompressed around a Mendez catheter.    Ix: Clearance / Volume   Modality: CVVHDF   DFR (ml/hr): 1500   RFR (ml/hr): 1000 / 500  BFR (ml/min): 250  K (meq): 4/0  UF (ml): Net negative  F life (hrs): >24   **Will continue on another day of CRRT  Access IJ temp HD catheter    2.  Hypotension: on pressors and inotrope.    3.  Metabolic acidosis: On bicarb gtt. RRT as above. Improving  4.  Hyperkalemia: Improved  5.  Bradycardia likely to MA, hyperK and hypothermia. Improved    Prognosis guarded.

## 2023-09-16 NOTE — PROGRESS NOTE ADULT - ASSESSMENT
Pt is a 67 y/o M pmhx of HTN, hypothyroid, ETOH cirrhosis and CKD who presents from nursing home for AMS and s/p fall. Pt was transported to [facility] by EMS for concern for rhabdomyolysis, in ED pt found to be hypothermic, acidotic and hyperkalemic, w/ serum Cr of 5.05. Pt found to be in sinus bradycardia w/ HR in the 40's, concern for junctional rhythm.   s/p CVV-HD 9/15 and 9/16, minimal urine output.     1. ARF   2. CKD   3. Hyperkalemia   4. Junctional bradycardia   5. B/L Pleural effusions   6. Pulmonary edema   7. Encephalopathy   8. Shock     Neuro:   metabolic encephalopathy in the setting of uremia and septic shock  - f/u ammonia levels  - started zyprexa 5mg q12 h    CV:  Septic shock, possibly due to uti - on levo, dopamine, and fredis  Bradycardia - episode of non-sustained WCT for 6 beats in the setting of metabolic acidosis, hyperk, and hypothyroid, no further events, ep consulted - no ep intervention recommended    Pulm: on 3Lnc  cxr with pulmonary edema and b/l pleural effusions    GI:   gi prophylaxis with iv protonix  diet: npo  rectal lactulose for constipation    Renal:  Concern ARF; KIM on CKD, minimal urine output; cr now 3  on CVV HD via IJ temporary catheter; 1 session yesterday and today    Heme  dvt prophylaxis: heparin subq    ID: septic shock  - follow up blood cultures  - on zosyn    Endo  - iv levothyroxine  - q6 glucose checks    code: dnr/dni    Patient requires continuous monitoring with bedside rhythm monitoring, pulse oximetry, ventilator/ bipap  monitoring and intermittent blood gas analysis.      Plan discussed with ICU team and attending Dr. Marti     Pt is a 67 y/o M pmhx of HTN, hypothyroid, ETOH cirrhosis and CKD who presents from nursing home for AMS and s/p fall. Pt was transported to [facility] by EMS for concern for rhabdomyolysis, in ED pt found to be hypothermic, acidotic and hyperkalemic, w/ serum Cr of 5.05. Pt found to be in sinus bradycardia w/ HR in the 40's, concern for junctional rhythm.   s/p CVV-HD 9/15 and 9/16, minimal urine output.     1. ARF   2. CKD   3. Hyperkalemia   4. Junctional bradycardia   5. B/L Pleural effusions   6. Pulmonary edema   7. Encephalopathy   8. Shock     Neuro:   metabolic encephalopathy in the setting of uremia and septic shock  - f/u ammonia levels  - started zyprexa 5mg q12 h    CV:  Septic shock, possibly due to uti - on levo, dopamine, and fredis  Bradycardia - episode of non-sustained WCT for 6 beats in the setting of metabolic acidosis, hyperk, and hypothyroid, no further events, ep consulted - no ep intervention recommended    Pulm: on 3Lnc  cxr with pulmonary edema and b/l pleural effusions    GI:   gi prophylaxis with iv protonix  diet: npo  rectal lactulose for constipation    Renal:  Concern ARF; KIM on CKD, minimal urine output; cr now 3  on CVV HD via IJ temporary catheter; 1 session yesterday and today    Heme  dvt prophylaxis: heparin subq    ID: septic shock  - follow up blood cultures  - on zosyn    Endo  - iv levothyroxine  - q6 glucose checks    code: dnr/dni    Patient requires continuous monitoring with bedside rhythm monitoring, pulse oximetry, ventilator/ bipap  monitoring and intermittent blood gas analysis.      Plan discussed with ICU team and attending Dr. Marti     Pt is a 67 y/o M pmhx of HTN, hypothyroid, ETOH cirrhosis and CKD who presents from nursing home for AMS and s/p fall. Pt was transported to [facility] by EMS for concern for rhabdomyolysis, in ED pt found to be hypothermic, acidotic and hyperkalemic, w/ serum Cr of 5.05. Pt found to be in sinus bradycardia w/ HR in the 40's, concern for junctional rhythm.   s/p CVV-HD 9/15 and 9/16, minimal urine output.     1. ARF   2. CKD   3. Hyperkalemia   4. Junctional bradycardia   5. B/L Pleural effusions   6. Pulmonary edema   7. Encephalopathy   8. Shock     Neuro:   metabolic encephalopathy in the setting of uremia and septic shock  - f/u ammonia levels  - started zyprexa 5mg q12 h    CV:  Septic shock, possibly due to uti - on levo, dopamine, and fredis  Bradycardia - episode of non-sustained WCT for 6 beats in the setting of metabolic acidosis, hyperk, and hypothyroid, no further events, ep consulted - no ep intervention recommended    Pulm: on 3Lnc  cxr with pulmonary edema and b/l pleural effusions    GI:   gi prophylaxis with iv protonix  diet: npo  rectal lactulose for constipation    Renal:  Concern ARF; KIM on CKD, minimal urine output; cr now 3  on CVV HD via IJ temporary catheter; 1 session yesterday and today    Heme  dvt prophylaxis: heparin subq    ID: septic shock  - follow up blood cultures  - on zosyn    Endo  - iv levothyroxine  - q6 glucose checks    code: dnr/dni    Patient requires continuous monitoring with bedside rhythm monitoring, pulse oximetry, ventilator/ bipap  monitoring and intermittent blood gas analysis.      Plan discussed with ICU team and attending Dr. Marti

## 2023-09-17 VITALS — TEMPERATURE: 93 F

## 2023-09-17 DIAGNOSIS — I50.30 UNSPECIFIED DIASTOLIC (CONGESTIVE) HEART FAILURE: ICD-10-CM

## 2023-09-17 LAB
ALBUMIN SERPL ELPH-MCNC: 2 G/DL — LOW (ref 3.3–5.2)
ALP SERPL-CCNC: 83 U/L — SIGNIFICANT CHANGE UP (ref 40–120)
ALT FLD-CCNC: 26 U/L — SIGNIFICANT CHANGE UP
AMMONIA BLD-MCNC: 46 UMOL/L — SIGNIFICANT CHANGE UP (ref 11–55)
ANION GAP SERPL CALC-SCNC: 15 MMOL/L — SIGNIFICANT CHANGE UP (ref 5–17)
ANISOCYTOSIS BLD QL: SLIGHT — SIGNIFICANT CHANGE UP
AST SERPL-CCNC: 58 U/L — HIGH
BASOPHILS # BLD AUTO: 0 K/UL — SIGNIFICANT CHANGE UP (ref 0–0.2)
BASOPHILS # BLD AUTO: 0.03 K/UL — SIGNIFICANT CHANGE UP (ref 0–0.2)
BASOPHILS NFR BLD AUTO: 0 % — SIGNIFICANT CHANGE UP (ref 0–2)
BASOPHILS NFR BLD AUTO: 0.4 % — SIGNIFICANT CHANGE UP (ref 0–2)
BILIRUB SERPL-MCNC: 3.5 MG/DL — HIGH (ref 0.4–2)
BUN SERPL-MCNC: 32.8 MG/DL — HIGH (ref 8–20)
BURR CELLS BLD QL SMEAR: PRESENT — SIGNIFICANT CHANGE UP
CALCIUM SERPL-MCNC: 7.6 MG/DL — LOW (ref 8.4–10.5)
CHLORIDE SERPL-SCNC: 101 MMOL/L — SIGNIFICANT CHANGE UP (ref 96–108)
CO2 SERPL-SCNC: 20 MMOL/L — LOW (ref 22–29)
CREAT SERPL-MCNC: 2.7 MG/DL — HIGH (ref 0.5–1.3)
CULTURE RESULTS: NO GROWTH — SIGNIFICANT CHANGE UP
EGFR: 25 ML/MIN/1.73M2 — LOW
EOSINOPHIL # BLD AUTO: 0.13 K/UL — SIGNIFICANT CHANGE UP (ref 0–0.5)
EOSINOPHIL # BLD AUTO: 0.16 K/UL — SIGNIFICANT CHANGE UP (ref 0–0.5)
EOSINOPHIL NFR BLD AUTO: 1.8 % — SIGNIFICANT CHANGE UP (ref 0–6)
EOSINOPHIL NFR BLD AUTO: 1.9 % — SIGNIFICANT CHANGE UP (ref 0–6)
GIANT PLATELETS BLD QL SMEAR: PRESENT — SIGNIFICANT CHANGE UP
GLUCOSE SERPL-MCNC: 93 MG/DL — SIGNIFICANT CHANGE UP (ref 70–99)
HBV CORE AB SER-ACNC: SIGNIFICANT CHANGE UP
HBV SURFACE AB SER-ACNC: <3 MIU/ML — LOW
HBV SURFACE AG SER-ACNC: SIGNIFICANT CHANGE UP
HCT VFR BLD CALC: 22.2 % — LOW (ref 39–50)
HCT VFR BLD CALC: 23 % — LOW (ref 39–50)
HGB BLD-MCNC: 8.1 G/DL — LOW (ref 13–17)
HGB BLD-MCNC: 8.3 G/DL — LOW (ref 13–17)
IMM GRANULOCYTES NFR BLD AUTO: 0.7 % — SIGNIFICANT CHANGE UP (ref 0–0.9)
LYMPHOCYTES # BLD AUTO: 0.79 K/UL — LOW (ref 1–3.3)
LYMPHOCYTES # BLD AUTO: 0.9 K/UL — LOW (ref 1–3.3)
LYMPHOCYTES # BLD AUTO: 13.1 % — SIGNIFICANT CHANGE UP (ref 13–44)
LYMPHOCYTES # BLD AUTO: 8.9 % — LOW (ref 13–44)
MAGNESIUM SERPL-MCNC: 2.3 MG/DL — SIGNIFICANT CHANGE UP (ref 1.6–2.6)
MANUAL SMEAR VERIFICATION: SIGNIFICANT CHANGE UP
MCHC RBC-ENTMCNC: 33.3 PG — SIGNIFICANT CHANGE UP (ref 27–34)
MCHC RBC-ENTMCNC: 33.9 PG — SIGNIFICANT CHANGE UP (ref 27–34)
MCHC RBC-ENTMCNC: 36.1 GM/DL — HIGH (ref 32–36)
MCHC RBC-ENTMCNC: 36.5 GM/DL — HIGH (ref 32–36)
MCV RBC AUTO: 91.4 FL — SIGNIFICANT CHANGE UP (ref 80–100)
MCV RBC AUTO: 93.9 FL — SIGNIFICANT CHANGE UP (ref 80–100)
METAMYELOCYTES # FLD: 1.8 % — HIGH (ref 0–0)
MICROCYTES BLD QL: SLIGHT — SIGNIFICANT CHANGE UP
MONOCYTES # BLD AUTO: 1.43 K/UL — HIGH (ref 0–0.9)
MONOCYTES # BLD AUTO: 1.66 K/UL — HIGH (ref 0–0.9)
MONOCYTES NFR BLD AUTO: 16.1 % — HIGH (ref 2–14)
MONOCYTES NFR BLD AUTO: 24.2 % — HIGH (ref 2–14)
NEUTROPHILS # BLD AUTO: 4.09 K/UL — SIGNIFICANT CHANGE UP (ref 1.8–7.4)
NEUTROPHILS # BLD AUTO: 6.2 K/UL — SIGNIFICANT CHANGE UP (ref 1.8–7.4)
NEUTROPHILS NFR BLD AUTO: 59.7 % — SIGNIFICANT CHANGE UP (ref 43–77)
NEUTROPHILS NFR BLD AUTO: 69.6 % — SIGNIFICANT CHANGE UP (ref 43–77)
NRBC # BLD: 1 /100 — HIGH (ref 0–0)
OVALOCYTES BLD QL SMEAR: SLIGHT — SIGNIFICANT CHANGE UP
PHOSPHATE SERPL-MCNC: 4.4 MG/DL — SIGNIFICANT CHANGE UP (ref 2.4–4.7)
PLAT MORPH BLD: NORMAL — SIGNIFICANT CHANGE UP
PLATELET # BLD AUTO: 59 K/UL — LOW (ref 150–400)
PLATELET # BLD AUTO: 63 K/UL — LOW (ref 150–400)
POIKILOCYTOSIS BLD QL AUTO: SIGNIFICANT CHANGE UP
POLYCHROMASIA BLD QL SMEAR: SLIGHT — SIGNIFICANT CHANGE UP
POTASSIUM SERPL-MCNC: 4 MMOL/L — SIGNIFICANT CHANGE UP (ref 3.5–5.3)
POTASSIUM SERPL-SCNC: 4 MMOL/L — SIGNIFICANT CHANGE UP (ref 3.5–5.3)
PROT SERPL-MCNC: 5.9 G/DL — LOW (ref 6.6–8.7)
RBC # BLD: 2.43 M/UL — LOW (ref 4.2–5.8)
RBC # BLD: 2.45 M/UL — LOW (ref 4.2–5.8)
RBC # FLD: 18.4 % — HIGH (ref 10.3–14.5)
RBC # FLD: 18.6 % — HIGH (ref 10.3–14.5)
RBC BLD AUTO: ABNORMAL
SMUDGE CELLS # BLD: PRESENT — SIGNIFICANT CHANGE UP
SODIUM SERPL-SCNC: 136 MMOL/L — SIGNIFICANT CHANGE UP (ref 135–145)
SPECIMEN SOURCE: SIGNIFICANT CHANGE UP
TARGETS BLD QL SMEAR: SIGNIFICANT CHANGE UP
VARIANT LYMPHS # BLD: 1.8 % — SIGNIFICANT CHANGE UP (ref 0–6)
WBC # BLD: 8.23 K/UL — SIGNIFICANT CHANGE UP (ref 3.8–10.5)
WBC # BLD: 8.91 K/UL — SIGNIFICANT CHANGE UP (ref 3.8–10.5)
WBC # FLD AUTO: 8.23 K/UL — SIGNIFICANT CHANGE UP (ref 3.8–10.5)
WBC # FLD AUTO: 8.91 K/UL — SIGNIFICANT CHANGE UP (ref 3.8–10.5)

## 2023-09-17 PROCEDURE — 82550 ASSAY OF CK (CPK): CPT

## 2023-09-17 PROCEDURE — 93306 TTE W/DOPPLER COMPLETE: CPT

## 2023-09-17 PROCEDURE — 96375 TX/PRO/DX INJ NEW DRUG ADDON: CPT

## 2023-09-17 PROCEDURE — 86706 HEP B SURFACE ANTIBODY: CPT

## 2023-09-17 PROCEDURE — 84443 ASSAY THYROID STIM HORMONE: CPT

## 2023-09-17 PROCEDURE — 87340 HEPATITIS B SURFACE AG IA: CPT

## 2023-09-17 PROCEDURE — 87641 MR-STAPH DNA AMP PROBE: CPT

## 2023-09-17 PROCEDURE — 96374 THER/PROPH/DIAG INJ IV PUSH: CPT

## 2023-09-17 PROCEDURE — 83735 ASSAY OF MAGNESIUM: CPT

## 2023-09-17 PROCEDURE — G1004: CPT

## 2023-09-17 PROCEDURE — 99232 SBSQ HOSP IP/OBS MODERATE 35: CPT

## 2023-09-17 PROCEDURE — 85018 HEMOGLOBIN: CPT

## 2023-09-17 PROCEDURE — 83605 ASSAY OF LACTIC ACID: CPT

## 2023-09-17 PROCEDURE — 82140 ASSAY OF AMMONIA: CPT

## 2023-09-17 PROCEDURE — 85014 HEMATOCRIT: CPT

## 2023-09-17 PROCEDURE — 74176 CT ABD & PELVIS W/O CONTRAST: CPT | Mod: ME

## 2023-09-17 PROCEDURE — 71250 CT THORAX DX C-: CPT | Mod: MG

## 2023-09-17 PROCEDURE — 99291 CRITICAL CARE FIRST HOUR: CPT

## 2023-09-17 PROCEDURE — 84132 ASSAY OF SERUM POTASSIUM: CPT

## 2023-09-17 PROCEDURE — 36415 COLL VENOUS BLD VENIPUNCTURE: CPT

## 2023-09-17 PROCEDURE — 80048 BASIC METABOLIC PNL TOTAL CA: CPT

## 2023-09-17 PROCEDURE — 80202 ASSAY OF VANCOMYCIN: CPT

## 2023-09-17 PROCEDURE — 93005 ELECTROCARDIOGRAM TRACING: CPT

## 2023-09-17 PROCEDURE — 82947 ASSAY GLUCOSE BLOOD QUANT: CPT

## 2023-09-17 PROCEDURE — 86803 HEPATITIS C AB TEST: CPT

## 2023-09-17 PROCEDURE — 80053 COMPREHEN METABOLIC PANEL: CPT

## 2023-09-17 PROCEDURE — 76775 US EXAM ABDO BACK WALL LIM: CPT

## 2023-09-17 PROCEDURE — 87086 URINE CULTURE/COLONY COUNT: CPT

## 2023-09-17 PROCEDURE — 94640 AIRWAY INHALATION TREATMENT: CPT

## 2023-09-17 PROCEDURE — 84295 ASSAY OF SERUM SODIUM: CPT

## 2023-09-17 PROCEDURE — 90945 DIALYSIS ONE EVALUATION: CPT

## 2023-09-17 PROCEDURE — 82962 GLUCOSE BLOOD TEST: CPT

## 2023-09-17 PROCEDURE — 85610 PROTHROMBIN TIME: CPT

## 2023-09-17 PROCEDURE — P9047: CPT

## 2023-09-17 PROCEDURE — 72125 CT NECK SPINE W/O DYE: CPT | Mod: MG

## 2023-09-17 PROCEDURE — 70450 CT HEAD/BRAIN W/O DYE: CPT | Mod: MG

## 2023-09-17 PROCEDURE — 81001 URINALYSIS AUTO W/SCOPE: CPT

## 2023-09-17 PROCEDURE — 85027 COMPLETE CBC AUTOMATED: CPT

## 2023-09-17 PROCEDURE — 84100 ASSAY OF PHOSPHORUS: CPT

## 2023-09-17 PROCEDURE — 71045 X-RAY EXAM CHEST 1 VIEW: CPT

## 2023-09-17 PROCEDURE — 82553 CREATINE MB FRACTION: CPT

## 2023-09-17 PROCEDURE — 87040 BLOOD CULTURE FOR BACTERIA: CPT

## 2023-09-17 PROCEDURE — 84436 ASSAY OF TOTAL THYROXINE: CPT

## 2023-09-17 PROCEDURE — 84484 ASSAY OF TROPONIN QUANT: CPT

## 2023-09-17 PROCEDURE — 85730 THROMBOPLASTIN TIME PARTIAL: CPT

## 2023-09-17 PROCEDURE — 83880 ASSAY OF NATRIURETIC PEPTIDE: CPT

## 2023-09-17 PROCEDURE — 85025 COMPLETE CBC W/AUTO DIFF WBC: CPT

## 2023-09-17 PROCEDURE — 82435 ASSAY OF BLOOD CHLORIDE: CPT

## 2023-09-17 PROCEDURE — 87640 STAPH A DNA AMP PROBE: CPT

## 2023-09-17 PROCEDURE — 82803 BLOOD GASES ANY COMBINATION: CPT

## 2023-09-17 PROCEDURE — 84480 ASSAY TRIIODOTHYRONINE (T3): CPT

## 2023-09-17 PROCEDURE — 82330 ASSAY OF CALCIUM: CPT

## 2023-09-17 PROCEDURE — 86704 HEP B CORE ANTIBODY TOTAL: CPT

## 2023-09-17 RX ORDER — PHYTONADIONE (VIT K1) 5 MG
10 TABLET ORAL ONCE
Refills: 0 | Status: COMPLETED | OUTPATIENT
Start: 2023-09-17 | End: 2023-09-17

## 2023-09-17 RX ORDER — HYDROMORPHONE HYDROCHLORIDE 2 MG/ML
1 INJECTION INTRAMUSCULAR; INTRAVENOUS; SUBCUTANEOUS
Qty: 100 | Refills: 0 | Status: DISCONTINUED | OUTPATIENT
Start: 2023-09-17 | End: 2023-09-17

## 2023-09-17 RX ORDER — HYDROMORPHONE HYDROCHLORIDE 2 MG/ML
0.5 INJECTION INTRAMUSCULAR; INTRAVENOUS; SUBCUTANEOUS EVERY 4 HOURS
Refills: 0 | Status: DISCONTINUED | OUTPATIENT
Start: 2023-09-17 | End: 2023-09-17

## 2023-09-17 RX ORDER — ROBINUL 0.2 MG/ML
0.4 INJECTION INTRAMUSCULAR; INTRAVENOUS ONCE
Refills: 0 | Status: COMPLETED | OUTPATIENT
Start: 2023-09-17 | End: 2023-09-17

## 2023-09-17 RX ORDER — HYDROMORPHONE HYDROCHLORIDE 2 MG/ML
0.2 INJECTION INTRAMUSCULAR; INTRAVENOUS; SUBCUTANEOUS
Qty: 10 | Refills: 0 | Status: DISCONTINUED | OUTPATIENT
Start: 2023-09-17 | End: 2023-09-17

## 2023-09-17 RX ORDER — HYDROMORPHONE HYDROCHLORIDE 2 MG/ML
0.5 INJECTION INTRAMUSCULAR; INTRAVENOUS; SUBCUTANEOUS ONCE
Refills: 0 | Status: DISCONTINUED | OUTPATIENT
Start: 2023-09-17 | End: 2023-09-17

## 2023-09-17 RX ORDER — HYDROMORPHONE HYDROCHLORIDE 2 MG/ML
4 INJECTION INTRAMUSCULAR; INTRAVENOUS; SUBCUTANEOUS
Qty: 100 | Refills: 0 | Status: DISCONTINUED | OUTPATIENT
Start: 2023-09-17 | End: 2023-09-17

## 2023-09-17 RX ORDER — MORPHINE SULFATE 50 MG/1
1 CAPSULE, EXTENDED RELEASE ORAL ONCE
Refills: 0 | Status: DISCONTINUED | OUTPATIENT
Start: 2023-09-17 | End: 2023-09-17

## 2023-09-17 RX ADMIN — HYDROMORPHONE HYDROCHLORIDE 1 MG/HR: 2 INJECTION INTRAMUSCULAR; INTRAVENOUS; SUBCUTANEOUS at 13:56

## 2023-09-17 RX ADMIN — Medication 2 MILLIGRAM(S): at 18:19

## 2023-09-17 RX ADMIN — Medication 102 MILLIGRAM(S): at 03:28

## 2023-09-17 RX ADMIN — PANTOPRAZOLE SODIUM 40 MILLIGRAM(S): 20 TABLET, DELAYED RELEASE ORAL at 05:11

## 2023-09-17 RX ADMIN — HYDROMORPHONE HYDROCHLORIDE 0.5 MILLIGRAM(S): 2 INJECTION INTRAMUSCULAR; INTRAVENOUS; SUBCUTANEOUS at 12:36

## 2023-09-17 RX ADMIN — Medication 2 MILLIGRAM(S): at 16:15

## 2023-09-17 RX ADMIN — Medication 5.57 MICROGRAM(S)/KG/MIN: at 12:06

## 2023-09-17 RX ADMIN — Medication 1 MILLIGRAM(S): at 12:04

## 2023-09-17 RX ADMIN — HYDROMORPHONE HYDROCHLORIDE 0.5 MILLIGRAM(S): 2 INJECTION INTRAMUSCULAR; INTRAVENOUS; SUBCUTANEOUS at 13:54

## 2023-09-17 RX ADMIN — OLANZAPINE 5 MILLIGRAM(S): 15 TABLET, FILM COATED ORAL at 05:11

## 2023-09-17 RX ADMIN — Medication 3 MILLILITER(S): at 08:32

## 2023-09-17 RX ADMIN — Medication 5.57 MICROGRAM(S)/KG/MIN: at 06:15

## 2023-09-17 RX ADMIN — HYDROMORPHONE HYDROCHLORIDE 0.5 MILLIGRAM(S): 2 INJECTION INTRAMUSCULAR; INTRAVENOUS; SUBCUTANEOUS at 13:29

## 2023-09-17 RX ADMIN — Medication 100 MILLIGRAM(S): at 11:36

## 2023-09-17 RX ADMIN — VASOPRESSIN 6 UNIT(S)/MIN: 20 INJECTION INTRAVENOUS at 02:39

## 2023-09-17 RX ADMIN — HYDROMORPHONE HYDROCHLORIDE 0.5 MILLIGRAM(S): 2 INJECTION INTRAMUSCULAR; INTRAVENOUS; SUBCUTANEOUS at 13:55

## 2023-09-17 RX ADMIN — MORPHINE SULFATE 1 MILLIGRAM(S): 50 CAPSULE, EXTENDED RELEASE ORAL at 02:38

## 2023-09-17 RX ADMIN — HYDROMORPHONE HYDROCHLORIDE 0.5 MILLIGRAM(S): 2 INJECTION INTRAMUSCULAR; INTRAVENOUS; SUBCUTANEOUS at 09:24

## 2023-09-17 RX ADMIN — Medication 5.57 MICROGRAM(S)/KG/MIN: at 01:46

## 2023-09-17 RX ADMIN — PIPERACILLIN AND TAZOBACTAM 25 GRAM(S): 4; .5 INJECTION, POWDER, LYOPHILIZED, FOR SOLUTION INTRAVENOUS at 09:00

## 2023-09-17 RX ADMIN — TRANEXAMIC ACID 500 MILLIGRAM(S): 100 INJECTION, SOLUTION INTRAVENOUS at 08:36

## 2023-09-17 RX ADMIN — PIPERACILLIN AND TAZOBACTAM 25 GRAM(S): 4; .5 INJECTION, POWDER, LYOPHILIZED, FOR SOLUTION INTRAVENOUS at 01:51

## 2023-09-17 RX ADMIN — CHLORHEXIDINE GLUCONATE 1 APPLICATION(S): 213 SOLUTION TOPICAL at 05:11

## 2023-09-17 RX ADMIN — Medication 3 MILLILITER(S): at 03:40

## 2023-09-17 RX ADMIN — ROBINUL 0.4 MILLIGRAM(S): 0.2 INJECTION INTRAMUSCULAR; INTRAVENOUS at 13:29

## 2023-09-17 RX ADMIN — MORPHINE SULFATE 1 MILLIGRAM(S): 50 CAPSULE, EXTENDED RELEASE ORAL at 03:11

## 2023-09-17 RX ADMIN — Medication 2 MILLIGRAM(S): at 21:20

## 2023-09-17 RX ADMIN — HYDROMORPHONE HYDROCHLORIDE 0.5 MILLIGRAM(S): 2 INJECTION INTRAMUSCULAR; INTRAVENOUS; SUBCUTANEOUS at 08:37

## 2023-09-17 RX ADMIN — CHLORHEXIDINE GLUCONATE 1 APPLICATION(S): 213 SOLUTION TOPICAL at 05:10

## 2023-09-17 RX ADMIN — HYDROMORPHONE HYDROCHLORIDE 1 MG/HR: 2 INJECTION INTRAMUSCULAR; INTRAVENOUS; SUBCUTANEOUS at 13:29

## 2023-09-17 NOTE — PROGRESS NOTE ADULT - NS PANP COMMENT GEN_ALL_CORE FT
Patient's living willing is fully reviewed, patient does not want any antibiotics, dialysis, or any invasive testing or treatment.  Discussed with family, will honor his wishes, and will switch over to comfort measure.

## 2023-09-17 NOTE — PROGRESS NOTE ADULT - ASSESSMENT
67yo M w/ HTN, Hypothyroidism, Alcoholic cirrhosis, CKD stage V admitted after acute mental status changes, worsening renal failure, sinus bradycardia with prolonged QTc interval and low voltage on his EKG (on Metoprolol 100mg daily in NH).  Etiology likely due to hyperkalemia, anion gap metabolic acidosis and sepsis (Lactate 3.1).  Also patient found to be hypotensive with systolic blood pressure in the 60s started on Levophed and Dopamine gtts admitted to ICU.  Cardiology called for fluid overload, likely due to underlying cardiomyopathy.  Patient w/ MOLST from NH.    69yo M w/ HTN, Hypothyroidism, Alcoholic cirrhosis, CKD stage V admitted after acute mental status changes, worsening renal failure, sinus bradycardia with prolonged QTc interval and low voltage on his EKG (on Metoprolol 100mg daily in NH).  Etiology likely due to hyperkalemia, anion gap metabolic acidosis and sepsis (Lactate 3.1).  Also patient found to be hypotensive with systolic blood pressure in the 60s started on Levophed and Dopamine gtts admitted to ICU.  Cardiology called for fluid overload, likely due to underlying cardiomyopathy.  Patient w/ MOLST from NH.

## 2023-09-17 NOTE — PROGRESS NOTE ADULT - ASSESSMENT
68M NH resident h/o HTN, hypothyroid, alcoholic cirrhosis and CKD, p/w AMS. Ability to obtain history is limited by pt's poor mental status/somnolence. On arrival, pt is noted to be hypothermic, acidotic, and hyperkalemic, with Cr 5.05, and sinus bradycardia to 40s bpm.  Nephrology is consulted for evaluation of RRT needs.      1. Acute kidney injury on CKD, NOS:  -KIM likely hemodynamically mediated in a patient w/ bradycardia, shock    -Baseline SCr: unclear, SCr on admission 5.05 mg/dl, he was started on CRRT  -UA: NA  -Imaging: KIDNEYS/URETERS: No hydronephrosis. A 3 mm nonobstructive stone of the interpolar right kidney. BLADDER: Decompressed around a Mendez catheter.    Ix: Clearance / Volume   Modality: CVVHDF   DFR (ml/hr): 1500   RFR (ml/hr): 1000 / 500  BFR (ml/min): 250  K (meq): 4/0  UF (ml): Net negative  F life (hrs): >24   **Will discontinue CRRT on family wishes, no more dialysis/RRT requested.    2.  Hypotension: on pressors and inotrope.    3.  Metabolic acidosis: On bicarb gtt. RRT as above. Improving  4.  Hyperkalemia: Improved  5.  Bradycardia likely to MA, hyperK and hypothermia. Improved    Prognosis guarded.

## 2023-09-17 NOTE — PROGRESS NOTE ADULT - SUBJECTIVE AND OBJECTIVE BOX
Patient is a 68y old  Male who presents with a chief complaint of S/P fall (16 Sep 2023 17:35)      BRIEF HOSPITAL COURSE: 67 y/o M with a h/o cirrhosis, HTN, hypothyroidism, CKD, alcohol abuse, admitted on 9/15 after being referred to the ED secondary to acute on chronic renal failure noted on lab work in the setting of AMS, fall, and possible UTI. He was found to be hypothermic, bradycardic, and hypotensive. Started on IV vasopressors for persistent hypotension/bradycardia. Started on CVVHD.    Events last 24 hours: Primarily NSR (HR 80s). Currently dependent on dual IV vasopressor therapy. CVVHD ongoing. He is having copious oral bleeding- suspect relation to trauma from NGT insertion attempts.        PAST MEDICAL & SURGICAL HISTORY:      Review of Systems:  Unable to obtain secondary to altered mentation.        Medications:  piperacillin/tazobactam IVPB.. 3.375 Gram(s) IV Intermittent every 8 hours  norepinephrine Infusion 0.05 MICROgram(s)/kG/Min IV Continuous <Continuous>  albuterol/ipratropium for Nebulization 3 milliLiter(s) Nebulizer every 6 hours  OLANZapine Injectable 5 milliGRAM(s) IntraMuscular every 12 hours  ondansetron Injectable 4 milliGRAM(s) IV Push every 6 hours PRN  tranexamic acid Injectable for Nebulization 500 milliGRAM(s) Inhalation every 8 hours  pantoprazole  Injectable 40 milliGRAM(s) IV Push two times a day  levothyroxine Injectable 44 MICROGram(s) IV Push at bedtime  vasopressin Infusion 0.04 Unit(s)/Min IV Continuous <Continuous>  folic acid Injectable 1 milliGRAM(s) IV Push daily  sodium chloride 0.9% lock flush 10 milliLiter(s) IV Push every 1 hour PRN  thiamine Injectable 100 milliGRAM(s) IV Push daily  chlorhexidine 2% Cloths 1 Application(s) Topical <User Schedule>  chlorhexidine 4% Liquid 1 Application(s) Topical <User Schedule>  CRRT Treatment    <Continuous>  Phoxillum Filtration BK 4 / 2.5 5000 milliLiter(s) CRRT <Continuous>  Phoxillum Filtration BK 4 / 2.5 5000 milliLiter(s) CRRT <Continuous>  PrismaSOL Filtration BGK 0 / 2.5 5000 milliLiter(s) CRRT <Continuous>          ICU Vital Signs Last 24 Hrs  T(C): 36.6 (16 Sep 2023 20:10), Max: 37.1 (16 Sep 2023 06:30)  T(F): 97.8 (16 Sep 2023 20:10), Max: 98.8 (16 Sep 2023 06:30)  HR: 79 (17 Sep 2023 01:30) (70 - 88)  BP: --  BP(mean): --  ABP: 120/58 (17 Sep 2023 01:30) (88/42 - 138/61)  ABP(mean): 81 (17 Sep 2023 01:30) (56 - 92)  RR: 13 (17 Sep 2023 01:30) (7 - 27)  SpO2: 100% (17 Sep 2023 01:30) (93% - 100%)    O2 Parameters below as of 17 Sep 2023 00:00  Patient On (Oxygen Delivery Method): mask, aerosol            ABG - ( 16 Sep 2023 21:06 )  pH, Arterial: 7.480 pH, Blood: x     /  pCO2: 28    /  pO2: 81    / HCO3: 21    / Base Excess: -2.6  /  SaO2: 99.6                I&O's Detail    15 Sep 2023 07:01  -  16 Sep 2023 07:00  --------------------------------------------------------  IN:    DOPamine Infusion: 106.8 mL    IV PiggyBack: 125 mL    Norepinephrine: 354 mL    Norepinephrine: 39 mL    Sodium Bicarbonate: 500 mL    Vasopressin: 42 mL  Total IN: 1166.8 mL    OUT:    EPINEPHrine: 0 mL    Indwelling Catheter - Urethral (mL): 0 mL    Other (mL): 499 mL  Total OUT: 499 mL    Total NET: 667.8 mL      16 Sep 2023 07:01  -  17 Sep 2023 02:01  --------------------------------------------------------  IN:    DOPamine Infusion: 115.7 mL    IV PiggyBack: 250 mL    Norepinephrine: 753.1 mL    Sodium Bicarbonate: 300 mL    Vasopressin: 114 mL  Total IN: 1532.8 mL    OUT:    Indwelling Catheter - Urethral (mL): 30 mL    Other (mL): 1631 mL  Total OUT: 1661 mL    Total NET: -128.2 mL            LABS:                        8.0    8.76  )-----------( 83       ( 16 Sep 2023 21:00 )             20.5     09-16    134<L>  |  100  |  37.9<H>  ----------------------------<  94  4.1   |  19.0<L>  |  3.04<H>    Ca    7.5<L>      16 Sep 2023 21:00  Phos  4.6     09-16  Mg     2.3     09-16    TPro  5.8<L>  /  Alb  2.1<L>  /  TBili  3.2<H>  /  DBili  x   /  AST  62<H>  /  ALT  27  /  AlkPhos  83  09-16      CARDIAC MARKERS ( 16 Sep 2023 00:00 )  x     / 0.16 ng/mL / x     / x     / x      CARDIAC MARKERS ( 15 Sep 2023 17:31 )  x     / x     / 195 U/L / x     / 14.0 ng/mL  CARDIAC MARKERS ( 15 Sep 2023 16:25 )  x     / 0.17 ng/mL / x     / x     / x          CAPILLARY BLOOD GLUCOSE      POCT Blood Glucose.: 91 mg/dL (15 Sep 2023 20:30)    PT/INR - ( 16 Sep 2023 21:00 )   PT: 22.7 sec;   INR: 2.09 ratio         PTT - ( 16 Sep 2023 21:00 )  PTT:46.5 sec  Urinalysis Basic - ( 16 Sep 2023 21:00 )    Color: x / Appearance: x / SG: x / pH: x  Gluc: 94 mg/dL / Ketone: x  / Bili: x / Urobili: x   Blood: x / Protein: x / Nitrite: x   Leuk Esterase: x / RBC: x / WBC x   Sq Epi: x / Non Sq Epi: x / Bacteria: x      CULTURES:  Culture Results:   No growth at 24 hours (09-15-23 @ 17:31)  Culture Results:   No growth at 24 hours (09-15-23 @ 17:20)        Physical Examination:    General: acute distress, coughing, bright red blood in oral cavity and dripping down chin    HEENT: Pupils equal, reactive to light.  Symmetric.    PULM: Clear to auscultation bilaterally, no significant sputum production    CVS: Regular rate and rhythm, no murmurs, rubs, or gallops    ABD: Soft, nondistended, nontender, normoactive bowel sounds, no masses    EXT: anasarca, nontender    SKIN: Warm and well perfused, no rashes noted.    NEURO: lethargic, mostly nonverbal, moves all extremities with generalized weakness        RADIOLOGY:     < from: Xray Chest 1 View-PORTABLE IMMEDIATE (Xray Chest 1 View-PORTABLE IMMEDIATE .) (09.16.23 @ 08:42) >    Findings:    9/15/2023, 3:46 p.m.  Low lung volumes. Bilateral perihilar vascular engorgement and   interstitial opacities consistent with volume overload.    9/15/2023, 11:59 p.m.  Interval placement of right nontunneled dialysis catheter with the tip at   the cavoatrial junction. Interval placement of left IJ central venous   catheter with the tip at the cavoatrial junction.  Perihilar vascular engorgement and interstitial peripheral opacities   consistent with pulmonary edema. The cardiomediastinal silhouette is   normal. Bones are grossly normal.    < end of copied text >    < from: CT Abdomen and Pelvis No Cont (09.15.23 @ 19:24) >  FINDINGS:  CHEST:  LUNGS AND LARGE AIRWAYS: Patent central airways. Bilateral groundglass   lung opacities.  PLEURA: Moderate right and small left pleural effusions.  VESSELS: Within normal limits.  HEART: Enlarged. No pericardial effusion.  MEDIASTINUM AND LEDA: No lymphadenopathy.  CHEST WALL AND LOWER NECK: Diffuse subcutaneous edema.    ABDOMEN AND PELVIS:  LIVER: Limited evaluation due to streak artifact and lack of IV contrast.   Grossly within normal limits.  BILE DUCTS: Normal caliber.  GALLBLADDER: Mildly distended. Question of tiny layering stones.  SPLEEN: Within normal limits.  PANCREAS: Limited in evaluation. Grossly within normal limits for  ADRENALS: Within normal limits.  KIDNEYS/URETERS: No hydronephrosis. A 3 mm nonobstructive stone of the   interpolar right kidney.    BLADDER: Decompressed around a Mendez catheter.  REPRODUCTIVE ORGANS: Prostate within normal limits.    BOWEL: Limited in evaluation. No bowel obstruction. Appendix is normal.  PERITONEUM: Small ascites.  VESSELS: Within normal limits.  RETROPERITONEUM/LYMPH NODES: No lymphadenopathy.  ABDOMINAL WALL: There are, diffuse subcutaneous edema.  BONES: Degenerative changes of the spine.    IMPRESSION:  1.  Mild pulmonaryedema. Bilateral pleural effusions.  2.  No hydronephrosis. 3 mm nonobstructive stone of the right kidney.  3.  Severe, diffuse subcutaneous edema.            CRITICAL CARE TIME SPENT: 42 mins  Time spent evaluating/treating patient with medical issues that pose a high risk for life threatening deterioration and/or end-organ damage, reviewing data/labs/imaging, discussing case with multidisciplinary team, discussing plan/goals of care with patient/family. Non-inclusive of procedure time.   Patient is a 68y old  Male who presents with a chief complaint of S/P fall (16 Sep 2023 17:35)      BRIEF HOSPITAL COURSE: 69 y/o M with a h/o cirrhosis, HTN, hypothyroidism, CKD, alcohol abuse, admitted on 9/15 after being referred to the ED secondary to acute on chronic renal failure noted on lab work in the setting of AMS, fall, and possible UTI. He was found to be hypothermic, bradycardic, and hypotensive. Started on IV vasopressors for persistent hypotension/bradycardia. Started on CVVHD.    Events last 24 hours: Primarily NSR (HR 80s). Currently dependent on dual IV vasopressor therapy. CVVHD ongoing. He is having copious oral bleeding- suspect relation to trauma from NGT insertion attempts.        PAST MEDICAL & SURGICAL HISTORY:      Review of Systems:  Unable to obtain secondary to altered mentation.        Medications:  piperacillin/tazobactam IVPB.. 3.375 Gram(s) IV Intermittent every 8 hours  norepinephrine Infusion 0.05 MICROgram(s)/kG/Min IV Continuous <Continuous>  albuterol/ipratropium for Nebulization 3 milliLiter(s) Nebulizer every 6 hours  OLANZapine Injectable 5 milliGRAM(s) IntraMuscular every 12 hours  ondansetron Injectable 4 milliGRAM(s) IV Push every 6 hours PRN  tranexamic acid Injectable for Nebulization 500 milliGRAM(s) Inhalation every 8 hours  pantoprazole  Injectable 40 milliGRAM(s) IV Push two times a day  levothyroxine Injectable 44 MICROGram(s) IV Push at bedtime  vasopressin Infusion 0.04 Unit(s)/Min IV Continuous <Continuous>  folic acid Injectable 1 milliGRAM(s) IV Push daily  sodium chloride 0.9% lock flush 10 milliLiter(s) IV Push every 1 hour PRN  thiamine Injectable 100 milliGRAM(s) IV Push daily  chlorhexidine 2% Cloths 1 Application(s) Topical <User Schedule>  chlorhexidine 4% Liquid 1 Application(s) Topical <User Schedule>  CRRT Treatment    <Continuous>  Phoxillum Filtration BK 4 / 2.5 5000 milliLiter(s) CRRT <Continuous>  Phoxillum Filtration BK 4 / 2.5 5000 milliLiter(s) CRRT <Continuous>  PrismaSOL Filtration BGK 0 / 2.5 5000 milliLiter(s) CRRT <Continuous>          ICU Vital Signs Last 24 Hrs  T(C): 36.6 (16 Sep 2023 20:10), Max: 37.1 (16 Sep 2023 06:30)  T(F): 97.8 (16 Sep 2023 20:10), Max: 98.8 (16 Sep 2023 06:30)  HR: 79 (17 Sep 2023 01:30) (70 - 88)  BP: --  BP(mean): --  ABP: 120/58 (17 Sep 2023 01:30) (88/42 - 138/61)  ABP(mean): 81 (17 Sep 2023 01:30) (56 - 92)  RR: 13 (17 Sep 2023 01:30) (7 - 27)  SpO2: 100% (17 Sep 2023 01:30) (93% - 100%)    O2 Parameters below as of 17 Sep 2023 00:00  Patient On (Oxygen Delivery Method): mask, aerosol            ABG - ( 16 Sep 2023 21:06 )  pH, Arterial: 7.480 pH, Blood: x     /  pCO2: 28    /  pO2: 81    / HCO3: 21    / Base Excess: -2.6  /  SaO2: 99.6                I&O's Detail    15 Sep 2023 07:01  -  16 Sep 2023 07:00  --------------------------------------------------------  IN:    DOPamine Infusion: 106.8 mL    IV PiggyBack: 125 mL    Norepinephrine: 354 mL    Norepinephrine: 39 mL    Sodium Bicarbonate: 500 mL    Vasopressin: 42 mL  Total IN: 1166.8 mL    OUT:    EPINEPHrine: 0 mL    Indwelling Catheter - Urethral (mL): 0 mL    Other (mL): 499 mL  Total OUT: 499 mL    Total NET: 667.8 mL      16 Sep 2023 07:01  -  17 Sep 2023 02:01  --------------------------------------------------------  IN:    DOPamine Infusion: 115.7 mL    IV PiggyBack: 250 mL    Norepinephrine: 753.1 mL    Sodium Bicarbonate: 300 mL    Vasopressin: 114 mL  Total IN: 1532.8 mL    OUT:    Indwelling Catheter - Urethral (mL): 30 mL    Other (mL): 1631 mL  Total OUT: 1661 mL    Total NET: -128.2 mL            LABS:                        8.0    8.76  )-----------( 83       ( 16 Sep 2023 21:00 )             20.5     09-16    134<L>  |  100  |  37.9<H>  ----------------------------<  94  4.1   |  19.0<L>  |  3.04<H>    Ca    7.5<L>      16 Sep 2023 21:00  Phos  4.6     09-16  Mg     2.3     09-16    TPro  5.8<L>  /  Alb  2.1<L>  /  TBili  3.2<H>  /  DBili  x   /  AST  62<H>  /  ALT  27  /  AlkPhos  83  09-16      CARDIAC MARKERS ( 16 Sep 2023 00:00 )  x     / 0.16 ng/mL / x     / x     / x      CARDIAC MARKERS ( 15 Sep 2023 17:31 )  x     / x     / 195 U/L / x     / 14.0 ng/mL  CARDIAC MARKERS ( 15 Sep 2023 16:25 )  x     / 0.17 ng/mL / x     / x     / x          CAPILLARY BLOOD GLUCOSE      POCT Blood Glucose.: 91 mg/dL (15 Sep 2023 20:30)    PT/INR - ( 16 Sep 2023 21:00 )   PT: 22.7 sec;   INR: 2.09 ratio         PTT - ( 16 Sep 2023 21:00 )  PTT:46.5 sec  Urinalysis Basic - ( 16 Sep 2023 21:00 )    Color: x / Appearance: x / SG: x / pH: x  Gluc: 94 mg/dL / Ketone: x  / Bili: x / Urobili: x   Blood: x / Protein: x / Nitrite: x   Leuk Esterase: x / RBC: x / WBC x   Sq Epi: x / Non Sq Epi: x / Bacteria: x      CULTURES:  Culture Results:   No growth at 24 hours (09-15-23 @ 17:31)  Culture Results:   No growth at 24 hours (09-15-23 @ 17:20)        Physical Examination:    General: acute distress, coughing, bright red blood in oral cavity and dripping down chin    HEENT: Pupils equal, reactive to light.  Symmetric.    PULM: Clear to auscultation bilaterally, no significant sputum production    CVS: Regular rate and rhythm, no murmurs, rubs, or gallops    ABD: Soft, nondistended, nontender, normoactive bowel sounds, no masses    EXT: anasarca, nontender    SKIN: Warm and well perfused, no rashes noted.    NEURO: lethargic, mostly nonverbal, moves all extremities with generalized weakness        RADIOLOGY:     < from: Xray Chest 1 View-PORTABLE IMMEDIATE (Xray Chest 1 View-PORTABLE IMMEDIATE .) (09.16.23 @ 08:42) >    Findings:    9/15/2023, 3:46 p.m.  Low lung volumes. Bilateral perihilar vascular engorgement and   interstitial opacities consistent with volume overload.    9/15/2023, 11:59 p.m.  Interval placement of right nontunneled dialysis catheter with the tip at   the cavoatrial junction. Interval placement of left IJ central venous   catheter with the tip at the cavoatrial junction.  Perihilar vascular engorgement and interstitial peripheral opacities   consistent with pulmonary edema. The cardiomediastinal silhouette is   normal. Bones are grossly normal.    < end of copied text >    < from: CT Abdomen and Pelvis No Cont (09.15.23 @ 19:24) >  FINDINGS:  CHEST:  LUNGS AND LARGE AIRWAYS: Patent central airways. Bilateral groundglass   lung opacities.  PLEURA: Moderate right and small left pleural effusions.  VESSELS: Within normal limits.  HEART: Enlarged. No pericardial effusion.  MEDIASTINUM AND LEDA: No lymphadenopathy.  CHEST WALL AND LOWER NECK: Diffuse subcutaneous edema.    ABDOMEN AND PELVIS:  LIVER: Limited evaluation due to streak artifact and lack of IV contrast.   Grossly within normal limits.  BILE DUCTS: Normal caliber.  GALLBLADDER: Mildly distended. Question of tiny layering stones.  SPLEEN: Within normal limits.  PANCREAS: Limited in evaluation. Grossly within normal limits for  ADRENALS: Within normal limits.  KIDNEYS/URETERS: No hydronephrosis. A 3 mm nonobstructive stone of the   interpolar right kidney.    BLADDER: Decompressed around a Mendez catheter.  REPRODUCTIVE ORGANS: Prostate within normal limits.    BOWEL: Limited in evaluation. No bowel obstruction. Appendix is normal.  PERITONEUM: Small ascites.  VESSELS: Within normal limits.  RETROPERITONEUM/LYMPH NODES: No lymphadenopathy.  ABDOMINAL WALL: There are, diffuse subcutaneous edema.  BONES: Degenerative changes of the spine.    IMPRESSION:  1.  Mild pulmonaryedema. Bilateral pleural effusions.  2.  No hydronephrosis. 3 mm nonobstructive stone of the right kidney.  3.  Severe, diffuse subcutaneous edema.            CRITICAL CARE TIME SPENT: 42 mins  Time spent evaluating/treating patient with medical issues that pose a high risk for life threatening deterioration and/or end-organ damage, reviewing data/labs/imaging, discussing case with multidisciplinary team, discussing plan/goals of care with patient/family. Non-inclusive of procedure time.

## 2023-09-17 NOTE — PROGRESS NOTE ADULT - SUBJECTIVE AND OBJECTIVE BOX
Reason for visit: KIM/CRRT    Subjective/Overnight: Seen on CRRT this AM in ICU, family at bedside withdrawing invasive care.    ROS: Unable to obtain secondary to patient current clinical condition.     PHYSICAL EXAM:  Gen: ill appearing, lethargic  MS: lethargic, not answering appropriately   Eyes: + icterus  HENT: NCAT, MMM  CV: rhythm reg reg, rate santo, no m/g/r, + LE edema (wrapped)  Chest: Coarse BS  Abd: soft, ND  Skin: dry, warm, no rash or jaundice  Andrea+    =======================================================  Vital Signs Last 24 Hrs  T(C): 36.3 (17 Sep 2023 13:00), Max: 36.6 (16 Sep 2023 20:10)  T(F): 97.3 (17 Sep 2023 13:00), Max: 97.9 (17 Sep 2023 05:00)  HR: 80 (17 Sep 2023 13:00) (77 - 88)  BP: --  BP(mean): --  RR: 12 (17 Sep 2023 13:00) (0 - 27)  SpO2: 98% (17 Sep 2023 13:00) (91% - 100%)    Parameters below as of 17 Sep 2023 12:00  Patient On (Oxygen Delivery Method): nasal cannula  O2 Flow (L/min): 3    I&O's Summary    16 Sep 2023 07:01  -  17 Sep 2023 07:00  --------------------------------------------------------  IN: 1921.7 mL / OUT: 2110 mL / NET: -188.3 mL    17 Sep 2023 07:01  -  17 Sep 2023 18:35  --------------------------------------------------------  IN: 434.7 mL / OUT: 414 mL / NET: 20.7 mL      =======================================================  Current Antibiotics:  piperacillin/tazobactam IVPB.. 3.375 Gram(s) IV Intermittent every 8 hours    Other medications:  albuterol/ipratropium for Nebulization 3 milliLiter(s) Nebulizer every 6 hours  chlorhexidine 2% Cloths 1 Application(s) Topical <User Schedule>  chlorhexidine 4% Liquid 1 Application(s) Topical <User Schedule>  folic acid Injectable 1 milliGRAM(s) IV Push daily  HYDROmorphone Infusion 4 mG/Hr IV Continuous <Continuous>  levothyroxine Injectable 44 MICROGram(s) IV Push at bedtime  OLANZapine Injectable 5 milliGRAM(s) IntraMuscular every 12 hours  pantoprazole  Injectable 40 milliGRAM(s) IV Push two times a day  thiamine Injectable 100 milliGRAM(s) IV Push daily  tranexamic acid Injectable for Nebulization 500 milliGRAM(s) Inhalation every 8 hours    =======================================================  09-17    136  |  101  |  32.8<H>  ----------------------------<  93  4.0   |  20.0<L>  |  2.70<H>    Ca    7.6<L>      17 Sep 2023 03:15  Phos  4.4     09-17  Mg     2.3     09-17    TPro  5.9<L>  /  Alb  2.0<L>  /  TBili  3.5<H>  /  DBili  x   /  AST  58<H>  /  ALT  26  /  AlkPhos  83  09-17    Creatinine: 2.70 mg/dL (09-17-23 @ 03:15)  Creatinine: 3.04 mg/dL (09-16-23 @ 21:00)  Creatinine: 3.78 mg/dL (09-16-23 @ 14:25)  Creatinine: 4.03 mg/dL (09-16-23 @ 08:15)  Creatinine: 4.83 mg/dL (09-16-23 @ 03:00)  Creatinine: 5.30 mg/dL (09-15-23 @ 17:31)  Creatinine: 5.05 mg/dL (09-15-23 @ 16:25)    Urinalysis Basic - ( 17 Sep 2023 03:15 )    Color: x / Appearance: x / SG: x / pH: x  Gluc: 93 mg/dL / Ketone: x  / Bili: x / Urobili: x   Blood: x / Protein: x / Nitrite: x   Leuk Esterase: x / RBC: x / WBC x   Sq Epi: x / Non Sq Epi: x / Bacteria: x      ======================================================= Reason for visit: KIM/CRRT    Subjective/Overnight: Seen on CRRT this AM in ICU, family at bedside withdrawing invasive care.    ROS: Unable to obtain secondary to patient current clinical condition.     PHYSICAL EXAM:  Gen: ill appearing, lethargic  MS: lethargic, not answering appropriately   Eyes: + icterus  HENT: NCAT, MMM  CV: rhythm reg reg, rate santo, no m/g/r, + LE edema (wrapped)  Chest: Coarse BS  Abd: soft, ND  Skin: dry, warm, no rash or jaundice  Andera+    =======================================================  Vital Signs Last 24 Hrs  T(C): 36.3 (17 Sep 2023 13:00), Max: 36.6 (16 Sep 2023 20:10)  T(F): 97.3 (17 Sep 2023 13:00), Max: 97.9 (17 Sep 2023 05:00)  HR: 80 (17 Sep 2023 13:00) (77 - 88)  BP: --  BP(mean): --  RR: 12 (17 Sep 2023 13:00) (0 - 27)  SpO2: 98% (17 Sep 2023 13:00) (91% - 100%)    Parameters below as of 17 Sep 2023 12:00  Patient On (Oxygen Delivery Method): nasal cannula  O2 Flow (L/min): 3    I&O's Summary    16 Sep 2023 07:01  -  17 Sep 2023 07:00  --------------------------------------------------------  IN: 1921.7 mL / OUT: 2110 mL / NET: -188.3 mL    17 Sep 2023 07:01  -  17 Sep 2023 18:35  --------------------------------------------------------  IN: 434.7 mL / OUT: 414 mL / NET: 20.7 mL      =======================================================  Current Antibiotics:  piperacillin/tazobactam IVPB.. 3.375 Gram(s) IV Intermittent every 8 hours    Other medications:  albuterol/ipratropium for Nebulization 3 milliLiter(s) Nebulizer every 6 hours  chlorhexidine 2% Cloths 1 Application(s) Topical <User Schedule>  chlorhexidine 4% Liquid 1 Application(s) Topical <User Schedule>  folic acid Injectable 1 milliGRAM(s) IV Push daily  HYDROmorphone Infusion 4 mG/Hr IV Continuous <Continuous>  levothyroxine Injectable 44 MICROGram(s) IV Push at bedtime  OLANZapine Injectable 5 milliGRAM(s) IntraMuscular every 12 hours  pantoprazole  Injectable 40 milliGRAM(s) IV Push two times a day  thiamine Injectable 100 milliGRAM(s) IV Push daily  tranexamic acid Injectable for Nebulization 500 milliGRAM(s) Inhalation every 8 hours    =======================================================  09-17    136  |  101  |  32.8<H>  ----------------------------<  93  4.0   |  20.0<L>  |  2.70<H>    Ca    7.6<L>      17 Sep 2023 03:15  Phos  4.4     09-17  Mg     2.3     09-17    TPro  5.9<L>  /  Alb  2.0<L>  /  TBili  3.5<H>  /  DBili  x   /  AST  58<H>  /  ALT  26  /  AlkPhos  83  09-17    Creatinine: 2.70 mg/dL (09-17-23 @ 03:15)  Creatinine: 3.04 mg/dL (09-16-23 @ 21:00)  Creatinine: 3.78 mg/dL (09-16-23 @ 14:25)  Creatinine: 4.03 mg/dL (09-16-23 @ 08:15)  Creatinine: 4.83 mg/dL (09-16-23 @ 03:00)  Creatinine: 5.30 mg/dL (09-15-23 @ 17:31)  Creatinine: 5.05 mg/dL (09-15-23 @ 16:25)    Urinalysis Basic - ( 17 Sep 2023 03:15 )    Color: x / Appearance: x / SG: x / pH: x  Gluc: 93 mg/dL / Ketone: x  / Bili: x / Urobili: x   Blood: x / Protein: x / Nitrite: x   Leuk Esterase: x / RBC: x / WBC x   Sq Epi: x / Non Sq Epi: x / Bacteria: x      =======================================================

## 2023-09-17 NOTE — PROGRESS NOTE ADULT - ASSESSMENT
67 y/o M with a h/o cirrhosis, HTN, hypothyroidism, CKD, alcohol abuse, with:    # Septic shock  # KIM on CKD  # Metabolic acidosis  # Bradycardia  # Oropharyngeal hemorrhage     Sepsis possibly related to UTI as he was being treated for this as an outpatient. Urine culture is pending. Could be related to pneumonia as well given bilateral ground glass opacities on CT chest.    - actively titrating norepinephrine infusion to maintain a MAP > 65, still requiring a sizeable dose rate  - maintain fixed dose vasopressin  - bradycardia has resolved, discontinue dopamine  - blood cultures are unremarkable thus far, continue empiric Zosyn for now  - maintain CVVHD due to hemodynamic compromise, nephrology input appreciated  - trend BUN/Cr, electrolytes, acid-base balance, and monitor UOP  - oropharyngeal hemorrhage likely due to trauma from NGT insertion attempts  - start nebulized tranexamic acid  - discontinue SC heparin  - ENT eval if unable to control bleeding    Case discussed with MICU physician, Dr. Augustin.   69 y/o M with a h/o cirrhosis, HTN, hypothyroidism, CKD, alcohol abuse, with:    # Septic shock  # KIM on CKD  # Metabolic acidosis  # Bradycardia  # Oropharyngeal hemorrhage     Sepsis possibly related to UTI as he was being treated for this as an outpatient. Urine culture is pending. Could be related to pneumonia as well given bilateral ground glass opacities on CT chest.    - actively titrating norepinephrine infusion to maintain a MAP > 65, still requiring a sizeable dose rate  - maintain fixed dose vasopressin  - bradycardia has resolved, discontinue dopamine  - blood cultures are unremarkable thus far, continue empiric Zosyn for now  - maintain CVVHD due to hemodynamic compromise, nephrology input appreciated  - trend BUN/Cr, electrolytes, acid-base balance, and monitor UOP  - oropharyngeal hemorrhage likely due to trauma from NGT insertion attempts  - start nebulized tranexamic acid  - discontinue SC heparin  - ENT eval if unable to control bleeding    Case discussed with MICU physician, Dr. Augustin.

## 2023-09-17 NOTE — PROGRESS NOTE ADULT - PROBLEM SELECTOR PLAN 1
- on admission, proBNP is 4800.   - Diuretics on hold due to hypotension (currently on levophed and vasopressin)   - TTE: EF 60-65% normal EF   - CXR: Low lung volumes. Bilateral perihilar vascular engorgement and interstitial opacities consistent with volume overload. pulmonary edema  - Volume removal with CRRT   - was on metoprolol outpatient however on hold due to hypotension   - Maintain MAP >65.  - Will need GOC discussion

## 2023-09-17 NOTE — PROGRESS NOTE ADULT - PROBLEM SELECTOR PLAN 2
-Resolved. No episodes of sinus bradycardia noted on telemetry   - currently, NSR on telemetry   - was on metoprolol outpatient. continue to hold due to hypotension   - dopamine d/c'd   - monitor on telemetry

## 2023-09-17 NOTE — DISCHARGE NOTE FOR THE EXPIRED PATIENT - HOSPITAL COURSE
69 y/o M with a h/o cirrhosis, HTN, hypothyroidism, CKD, alcohol abuse, admitted on 9/15 after being referred to the ED secondary to acute on chronic renal failure noted on lab work in the setting of AMS, fall, and possible UTI. He was found to be hypothermic, bradycardic, and hypotensive. Started on IV vasopressors for persistent hypotension/bradycardia. Started on CVVHD. Treated for sepsis secondary to suspected UTI and multifocal pneumonia. Hospital course complicated by oropharyngeal bleeding. The patient's family transitioned to comfort measures only today and medical therapies were ceased. He developed asystolic cardiac arrest at 2131 and was pronounced dead by myself, Twin Mitchell PA-C, on 9/17/23.    Case discussed with MICU physician, Dr. Augustin.    Total time spent on patient discharge: 35 mins 67 y/o M with a h/o cirrhosis, HTN, hypothyroidism, CKD, alcohol abuse, admitted on 9/15 after being referred to the ED secondary to acute on chronic renal failure noted on lab work in the setting of AMS, fall, and possible UTI. He was found to be hypothermic, bradycardic, and hypotensive. Started on IV vasopressors for persistent hypotension/bradycardia. Started on CVVHD. Treated for sepsis secondary to suspected UTI and multifocal pneumonia. Hospital course complicated by oropharyngeal bleeding. The patient's family transitioned to comfort measures only today and medical therapies were ceased. He developed asystolic cardiac arrest at 2131 and was pronounced dead by myself, Twin Mitchell PA-C, on 9/17/23.    Case discussed with MICU physician, Dr. Augustin.    Total time spent on patient discharge: 35 mins 67 y/o M with a h/o cirrhosis, HTN, hypothyroidism, CKD, alcohol abuse, admitted on 9/15 after being referred to the ED secondary to acute on chronic renal failure noted on lab work in the setting of AMS, fall, and possible UTI. He was found to be hypothermic, bradycardic, and hypotensive. Started on IV vasopressors for persistent hypotension/bradycardia. Started on CVVHD. Treated for sepsis secondary to suspected UTI and multifocal pneumonia. Hospital course complicated by oropharyngeal bleeding. The patient's family transitioned to comfort measures only today and medical therapies were ceased. He developed asystolic cardiac arrest at 2131 and was pronounced dead by myself, Twin Mitchell PA-C, on 9/17/23. I contacted the patient's sister, Tanika Hassan, and updated her. All questions answered and concerns addressed.    Case discussed with MICU physician, Dr. Augustin.    Total time spent on patient discharge: 35 mins

## 2023-09-17 NOTE — GOALS OF CARE CONVERSATION - ADVANCED CARE PLANNING - CONVERSATION DETAILS
BRIEF HOSPITAL COURSE: 69 y/o M with a h/o cirrhosis, HTN, hypothyroidism, CKD, alcohol abuse, admitted on 9/15 after being referred to the ED secondary to acute on chronic renal failure noted on lab work in the setting of AMS, fall, and possible UTI. He was found to be hypothermic, bradycardic, and hypotensive. Started on IV vasopressors for persistent hypotension/bradycardia and CVVHD.    Patient remains in dual pressor stock state with escalating requirements to maintain MAP>65. Still depended on CVVHD. Despite our best resuscitation efforts with vasopressors and CVVHD, patient continues to decline with progression towards MSOF. Family updated about his critical condition, explained that these interventions will only prolong his pain and suffering without any survival benefit. Decision was made to honor his living will and stop all invasive/heroic measures including dialysis, abx, and pressors. Patient to be made comfortable with Dilaudid infusion.  to be contacted. All family present at bedside when conversation took place. BRIEF HOSPITAL COURSE: 67 y/o M with a h/o cirrhosis, HTN, hypothyroidism, CKD, alcohol abuse, admitted on 9/15 after being referred to the ED secondary to acute on chronic renal failure noted on lab work in the setting of AMS, fall, and possible UTI. He was found to be hypothermic, bradycardic, and hypotensive. Started on IV vasopressors for persistent hypotension/bradycardia and CVVHD.    Patient remains in dual pressor stock state with escalating requirements to maintain MAP>65. Still depended on CVVHD. Despite our best resuscitation efforts with vasopressors and CVVHD, patient continues to decline with progression towards MSOF. Family updated about his critical condition, explained that these interventions will only prolong his pain and suffering without any survival benefit. Decision was made to honor his living will and stop all invasive/heroic measures including dialysis, abx, and pressors. Patient to be made comfortable with Dilaudid infusion.  to be contacted. All family present at bedside when conversation took place. Patient is a 69 y/o M with a h/o cirrhosis, HTN, hypothyroidism, CKD, alcohol abuse, admitted on 9/15 after being referred to the ED secondary to acute on chronic renal failure noted on lab work in the setting of AMS, fall, and possible UTI. He was found to be hypothermic, bradycardic, and hypotensive. Started on IV vasopressors for persistent hypotension/bradycardia and CVVHD.    Patient remains in dual pressor stock state with escalating requirements to maintain MAP>65. Still depended on CVVHD. Despite our best resuscitation efforts, patient continues to decline with progression towards MSOF. Family updated about his critical condition, explained that these interventions will only prolong his pain and suffering without any survival benefit. Decision was made to honor his living will and stop all invasive/heroic measures including dialysis, abx, and pressors. Patient to be made comfortable with Dilaudid infusion.  to be contacted. All family present at bedside when conversation took place.

## 2023-09-17 NOTE — PROGRESS NOTE ADULT - SUBJECTIVE AND OBJECTIVE BOX
[facility]                                                         CARDIOLOGY AT [facility]                                                                  [address], [place] [address]                                                         Telephone: [phone]. Fax:[phone]                                                                             PROGRESS NOTE    Reason for follow up: fluid overload   Update: pt is currently on CRRT. On levophed and vasopressin. Living will was brought in by family yesterday where it specifies that pt does not want life sustaining measures such as HD and antibiotics.        Review of symptoms: unable to perform ROS due to mentation.     Vitals:  T(C): 36.5 (09-17-23 @ 09:00), Max: 36.8 (09-16-23 @ 15:51)  HR: 82 (09-17-23 @ 09:00) (78 - 88)  BP: --  RR: 10 (09-17-23 @ 09:00) (0 - 27)  SpO2: 93% (09-17-23 @ 09:00) (93% - 100%)  Wt(kg): --  I&O's Summary    16 Sep 2023 07:01  -  17 Sep 2023 07:00  --------------------------------------------------------  IN: 1921.7 mL / OUT: 2110 mL / NET: -188.3 mL    17 Sep 2023 07:01  -  17 Sep 2023 09:29  --------------------------------------------------------  IN: 96.7 mL / OUT: 81 mL / NET: 15.7 mL      Weight (kg): 118.8 (09-15 @ 22:15)    PHYSICAL EXAM:  Appearance: Comfortable. No acute distress  HEENT:  Atraumatic. Normocephalic.  Normal oral mucosa  Neurologic: AOx0. Lethargic. no gross focal deficits.  Cardiovascular: RRR S1 S2, No murmur, no rubs/gallops. No JVD  Respiratory: Lungs sounds are coarse throughout. unlabored   Gastrointestinal:  Soft, Non-tender, + BS  Lower Extremities: 2+ Peripheral Pulses, No clubbing, cyanosis, or edema  Psychiatry: Patient is calm. No agitation.   Skin: warm and dry.    CURRENT CARDIAC MEDICATIONS:  norepinephrine Infusion 0.05 MICROgram(s)/kG/Min IV Continuous <Continuous>      CURRENT OTHER MEDICATIONS:  albuterol/ipratropium for Nebulization 3 milliLiter(s) Nebulizer every 6 hours  piperacillin/tazobactam IVPB.. 3.375 Gram(s) IV Intermittent every 8 hours  HYDROmorphone  Injectable 0.5 milliGRAM(s) IV Push every 4 hours PRN Moderate Pain (4 - 6)  OLANZapine Injectable 5 milliGRAM(s) IntraMuscular every 12 hours  ondansetron Injectable 4 milliGRAM(s) IV Push every 6 hours PRN Nausea and/or Vomiting  pantoprazole  Injectable 40 milliGRAM(s) IV Push two times a day  levothyroxine Injectable 44 MICROGram(s) IV Push at bedtime  vasopressin Infusion 0.04 Unit(s)/Min (6 mL/Hr) IV Continuous <Continuous>  chlorhexidine 2% Cloths 1 Application(s) Topical <User Schedule>  chlorhexidine 4% Liquid 1 Application(s) Topical <User Schedule>  folic acid Injectable 1 milliGRAM(s) IV Push daily  sodium chloride 0.9% lock flush 10 milliLiter(s) IV Push every 1 hour PRN Pre/post blood products, medications, blood draw, and to maintain line patency  thiamine Injectable 100 milliGRAM(s) IV Push daily  tranexamic acid Injectable for Nebulization 500 milliGRAM(s) Inhalation every 8 hours, Stop order after: 3 Days      LABS:	 	  ( 16 Sep 2023 00:00 )  Troponin T  0.16<H>,  CPK  X    , CKMB  X    , BNP X        , ( 15 Sep 2023 17:31 )  Troponin T  X    ,  CPK  195  , CKMB  X    , BNP X        , ( 15 Sep 2023 16:25 )  Troponin T  0.17<H>,  CPK  X    , CKMB  X    , BNP X                                  8.3    8.91  )-----------( 63       ( 17 Sep 2023 06:13 )             23.0     09-17    136  |  101  |  32.8<H>  ----------------------------<  93  4.0   |  20.0<L>  |  2.70<H>    Ca    7.6<L>      17 Sep 2023 03:15  Phos  4.4     09-17  Mg     2.3     09-17    TPro  5.9<L>  /  Alb  2.0<L>  /  TBili  3.5<H>  /  DBili  x   /  AST  58<H>  /  ALT  26  /  AlkPhos  83  09-17    PT/INR/PTT ( 16 Sep 2023 21:00 )                       :                       :      22.7         :       46.5                  .        .                   .              .           .       2.09        .                                       Lipid Profile:   HgA1c:   TSH: Thyroid Stimulating Hormone, Serum: 7.76 uIU/mL  Thyroid Stimulating Hormone, Serum: 8.37 uIU/mL      TELEMETRY: NSR (no junctional rhythm). Had an episode of irregular nonsustained WCT x 6 beats yesterday.   ECG: sinus bradycardia     DIAGNOSTIC TESTING:  [x ] Echocardiogram: < from: TTE Echo Complete w/o Contrast w/ Doppler (09.16.23 @ 08:49) >  :   1. Left ventricular ejection fraction, by visual estimation, is 60 to   65%.   2. Normal global left ventricular systolic function.   3. Severely enlarged left atrium.   4. The mitral in-flow pattern reveals no discernable A-wave, therefore   no comment on diastolic function can be made.   5. Moderately enlarged right ventricle.   6. Moderately enlarged right atrium.   7. Mild mitral annular calcification.   8. Mild mitral valve regurgitation.   9. Mild tricuspid regurgitation.  10. Mild aortic valve stenosis.  11. Estimated pulmonary artery systolic pressure is 45.5 mmHg assuming a   right atrial pressure of 8 mmHg, which is consistent with mild pulmonary   hypertension.  12. Endocardial visualization was enhanced with intravenous echo contrast.    < end of copied text >    [ ]  Catheterization:  [ ] Stress Test:    OTHER: 	                                                                [facility]                                                         CARDIOLOGY AT [facility]                                                                  [address], [place] [address]                                                         Telephone: [phone]. Fax:[phone]                                                                             PROGRESS NOTE    Reason for follow up: fluid overload   Update: pt is currently on CRRT. On levophed and vasopressin. Living will was brought in by family yesterday where it specifies that pt does not want life sustaining measures such as HD and antibiotics.        Review of symptoms: unable to perform ROS due to mentation.     Vitals:  T(C): 36.5 (09-17-23 @ 09:00), Max: 36.8 (09-16-23 @ 15:51)  HR: 82 (09-17-23 @ 09:00) (78 - 88)  BP: --  RR: 10 (09-17-23 @ 09:00) (0 - 27)  SpO2: 93% (09-17-23 @ 09:00) (93% - 100%)  Wt(kg): --  I&O's Summary    16 Sep 2023 07:01  -  17 Sep 2023 07:00  --------------------------------------------------------  IN: 1921.7 mL / OUT: 2110 mL / NET: -188.3 mL    17 Sep 2023 07:01  -  17 Sep 2023 09:29  --------------------------------------------------------  IN: 96.7 mL / OUT: 81 mL / NET: 15.7 mL      Weight (kg): 118.8 (09-15 @ 22:15)    PHYSICAL EXAM:  Appearance: Comfortable. No acute distress  HEENT:  Atraumatic. Normocephalic.  Normal oral mucosa  Neurologic: AOx0. Lethargic. no gross focal deficits.  Cardiovascular: RRR S1 S2, No murmur, no rubs/gallops. No JVD  Respiratory: Lungs sounds are coarse throughout. unlabored   Gastrointestinal:  Soft, Non-tender, + BS  Lower Extremities: 2+ Peripheral Pulses, No clubbing, cyanosis, or edema  Psychiatry: Patient is calm. No agitation.   Skin: warm and dry.    CURRENT CARDIAC MEDICATIONS:  norepinephrine Infusion 0.05 MICROgram(s)/kG/Min IV Continuous <Continuous>      CURRENT OTHER MEDICATIONS:  albuterol/ipratropium for Nebulization 3 milliLiter(s) Nebulizer every 6 hours  piperacillin/tazobactam IVPB.. 3.375 Gram(s) IV Intermittent every 8 hours  HYDROmorphone  Injectable 0.5 milliGRAM(s) IV Push every 4 hours PRN Moderate Pain (4 - 6)  OLANZapine Injectable 5 milliGRAM(s) IntraMuscular every 12 hours  ondansetron Injectable 4 milliGRAM(s) IV Push every 6 hours PRN Nausea and/or Vomiting  pantoprazole  Injectable 40 milliGRAM(s) IV Push two times a day  levothyroxine Injectable 44 MICROGram(s) IV Push at bedtime  vasopressin Infusion 0.04 Unit(s)/Min (6 mL/Hr) IV Continuous <Continuous>  chlorhexidine 2% Cloths 1 Application(s) Topical <User Schedule>  chlorhexidine 4% Liquid 1 Application(s) Topical <User Schedule>  folic acid Injectable 1 milliGRAM(s) IV Push daily  sodium chloride 0.9% lock flush 10 milliLiter(s) IV Push every 1 hour PRN Pre/post blood products, medications, blood draw, and to maintain line patency  thiamine Injectable 100 milliGRAM(s) IV Push daily  tranexamic acid Injectable for Nebulization 500 milliGRAM(s) Inhalation every 8 hours, Stop order after: 3 Days      LABS:	 	  ( 16 Sep 2023 00:00 )  Troponin T  0.16<H>,  CPK  X    , CKMB  X    , BNP X        , ( 15 Sep 2023 17:31 )  Troponin T  X    ,  CPK  195  , CKMB  X    , BNP X        , ( 15 Sep 2023 16:25 )  Troponin T  0.17<H>,  CPK  X    , CKMB  X    , BNP X                                  8.3    8.91  )-----------( 63       ( 17 Sep 2023 06:13 )             23.0     09-17    136  |  101  |  32.8<H>  ----------------------------<  93  4.0   |  20.0<L>  |  2.70<H>    Ca    7.6<L>      17 Sep 2023 03:15  Phos  4.4     09-17  Mg     2.3     09-17    TPro  5.9<L>  /  Alb  2.0<L>  /  TBili  3.5<H>  /  DBili  x   /  AST  58<H>  /  ALT  26  /  AlkPhos  83  09-17    PT/INR/PTT ( 16 Sep 2023 21:00 )                       :                       :      22.7         :       46.5                  .        .                   .              .           .       2.09        .                                       Lipid Profile:   HgA1c:   TSH: Thyroid Stimulating Hormone, Serum: 7.76 uIU/mL  Thyroid Stimulating Hormone, Serum: 8.37 uIU/mL      TELEMETRY: NSR (no junctional rhythm). Had an episode of irregular nonsustained WCT x 6 beats yesterday.   ECG: sinus bradycardia     DIAGNOSTIC TESTING:  [x ] Echocardiogram: < from: TTE Echo Complete w/o Contrast w/ Doppler (09.16.23 @ 08:49) >  :   1. Left ventricular ejection fraction, by visual estimation, is 60 to   65%.   2. Normal global left ventricular systolic function.   3. Severely enlarged left atrium.   4. The mitral in-flow pattern reveals no discernable A-wave, therefore   no comment on diastolic function can be made.   5. Moderately enlarged right ventricle.   6. Moderately enlarged right atrium.   7. Mild mitral annular calcification.   8. Mild mitral valve regurgitation.   9. Mild tricuspid regurgitation.  10. Mild aortic valve stenosis.  11. Estimated pulmonary artery systolic pressure is 45.5 mmHg assuming a   right atrial pressure of 8 mmHg, which is consistent with mild pulmonary   hypertension.  12. Endocardial visualization was enhanced with intravenous echo contrast.    < end of copied text >    [ ]  Catheterization:  [ ] Stress Test:    OTHER: 	                                                                [facility]                                                         CARDIOLOGY AT [facility]                                                                  [address], [place] [address]                                                         Telephone: [phone]. Fax:[phone]                                                                             PROGRESS NOTE    Reason for follow up: fluid overload   Update: pt is currently on CRRT. On levophed and vasopressin. Living will was brought in by family yesterday where it specifies that pt does not want life sustaining measures such as HD and antibiotics.        Review of symptoms: unable to perform ROS due to mentation.     Vitals:  T(C): 36.5 (09-17-23 @ 09:00), Max: 36.8 (09-16-23 @ 15:51)  HR: 82 (09-17-23 @ 09:00) (78 - 88)  BP: --  RR: 10 (09-17-23 @ 09:00) (0 - 27)  SpO2: 93% (09-17-23 @ 09:00) (93% - 100%)  Wt(kg): --  I&O's Summary    16 Sep 2023 07:01  -  17 Sep 2023 07:00  --------------------------------------------------------  IN: 1921.7 mL / OUT: 2110 mL / NET: -188.3 mL    17 Sep 2023 07:01  -  17 Sep 2023 09:29  --------------------------------------------------------  IN: 96.7 mL / OUT: 81 mL / NET: 15.7 mL      Weight (kg): 118.8 (09-15 @ 22:15)    PHYSICAL EXAM:  Appearance: Comfortable. No acute distress  HEENT:  Atraumatic. Normocephalic.  Normal oral mucosa  Neurologic: AOx0. Lethargic. no gross focal deficits.  Cardiovascular: RRR S1 S2, No murmur, no rubs/gallops. No JVD  Respiratory: Lungs sounds are coarse throughout. unlabored   Gastrointestinal:  Soft, Non-tender, + BS  Lower Extremities: 2+ Peripheral Pulses, No clubbing, cyanosis, or edema  Psychiatry: Patient is calm. No agitation.   Skin: warm and dry.    CURRENT CARDIAC MEDICATIONS:  norepinephrine Infusion 0.05 MICROgram(s)/kG/Min IV Continuous <Continuous>      CURRENT OTHER MEDICATIONS:  albuterol/ipratropium for Nebulization 3 milliLiter(s) Nebulizer every 6 hours  piperacillin/tazobactam IVPB.. 3.375 Gram(s) IV Intermittent every 8 hours  HYDROmorphone  Injectable 0.5 milliGRAM(s) IV Push every 4 hours PRN Moderate Pain (4 - 6)  OLANZapine Injectable 5 milliGRAM(s) IntraMuscular every 12 hours  ondansetron Injectable 4 milliGRAM(s) IV Push every 6 hours PRN Nausea and/or Vomiting  pantoprazole  Injectable 40 milliGRAM(s) IV Push two times a day  levothyroxine Injectable 44 MICROGram(s) IV Push at bedtime  vasopressin Infusion 0.04 Unit(s)/Min (6 mL/Hr) IV Continuous <Continuous>  chlorhexidine 2% Cloths 1 Application(s) Topical <User Schedule>  chlorhexidine 4% Liquid 1 Application(s) Topical <User Schedule>  folic acid Injectable 1 milliGRAM(s) IV Push daily  sodium chloride 0.9% lock flush 10 milliLiter(s) IV Push every 1 hour PRN Pre/post blood products, medications, blood draw, and to maintain line patency  thiamine Injectable 100 milliGRAM(s) IV Push daily  tranexamic acid Injectable for Nebulization 500 milliGRAM(s) Inhalation every 8 hours, Stop order after: 3 Days      LABS:	 	  ( 16 Sep 2023 00:00 )  Troponin T  0.16<H>,  CPK  X    , CKMB  X    , BNP X        , ( 15 Sep 2023 17:31 )  Troponin T  X    ,  CPK  195  , CKMB  X    , BNP X        , ( 15 Sep 2023 16:25 )  Troponin T  0.17<H>,  CPK  X    , CKMB  X    , BNP X                                  8.3    8.91  )-----------( 63       ( 17 Sep 2023 06:13 )             23.0     09-17    136  |  101  |  32.8<H>  ----------------------------<  93  4.0   |  20.0<L>  |  2.70<H>    Ca    7.6<L>      17 Sep 2023 03:15  Phos  4.4     09-17  Mg     2.3     09-17    TPro  5.9<L>  /  Alb  2.0<L>  /  TBili  3.5<H>  /  DBili  x   /  AST  58<H>  /  ALT  26  /  AlkPhos  83  09-17    PT/INR/PTT ( 16 Sep 2023 21:00 )                       :                       :      22.7         :       46.5                  .        .                   .              .           .       2.09        .                                       Lipid Profile:   HgA1c:   TSH: Thyroid Stimulating Hormone, Serum: 7.76 uIU/mL  Thyroid Stimulating Hormone, Serum: 8.37 uIU/mL      TELEMETRY: NSR (no junctional rhythm). Had an episode of irregular nonsustained WCT x 6 beats yesterday.   ECG: sinus bradycardia     DIAGNOSTIC TESTING:  [x ] Echocardiogram: < from: TTE Echo Complete w/o Contrast w/ Doppler (09.16.23 @ 08:49) >  :   1. Left ventricular ejection fraction, by visual estimation, is 60 to   65%.   2. Normal global left ventricular systolic function.   3. Severely enlarged left atrium.   4. The mitral in-flow pattern reveals no discernable A-wave, therefore   no comment on diastolic function can be made.   5. Moderately enlarged right ventricle.   6. Moderately enlarged right atrium.   7. Mild mitral annular calcification.   8. Mild mitral valve regurgitation.   9. Mild tricuspid regurgitation.  10. Mild aortic valve stenosis.  11. Estimated pulmonary artery systolic pressure is 45.5 mmHg assuming a   right atrial pressure of 8 mmHg, which is consistent with mild pulmonary   hypertension.  12. Endocardial visualization was enhanced with intravenous echo contrast.    < end of copied text >    [ ]  Catheterization:  [ ] Stress Test:    OTHER:

## 2023-09-17 NOTE — DISCHARGE NOTE FOR THE EXPIRED PATIENT - OTHER SIGNIFICANT FINDINGS
Patient was in b/l wrist restraints within 24 hours of expiration. Restraints were discontinued on  1300 and patient  on 2131. CMS log updated

## 2023-09-17 NOTE — PROGRESS NOTE ADULT - NS ATTEND AMEND GEN_ALL_CORE FT
HFpEF:  Diuretics on hold due to hypotension (currently on levophed and vasopressin).  TTE: EF 60-65% normal EF.  Volume removal with CRRT   was on metoprolol outpatient however on hold due to hypotension. Maintain MAP >65.  Will need GOC discussion.

## 2023-09-17 NOTE — PROGRESS NOTE ADULT - PROBLEM SELECTOR PLAN 3
- Admission Creatinine is 5.05. Unknown baseline.   - CRRT is initiated  - Creatinine is downtrending from 5.05 now down to 2.70  - Nephrology following

## 2023-09-18 LAB
GLUCOSE BLDC GLUCOMTR-MCNC: 172 MG/DL — HIGH (ref 70–99)

## 2023-09-20 LAB
CULTURE RESULTS: SIGNIFICANT CHANGE UP
SPECIMEN SOURCE: SIGNIFICANT CHANGE UP

## 2024-01-01 RX ORDER — ALLOPURINOL 300 MG
1 TABLET ORAL
Refills: 0 | DISCHARGE

## 2024-01-01 RX ORDER — LEVOTHYROXINE SODIUM 125 MCG
1 TABLET ORAL
Refills: 0 | DISCHARGE

## 2024-01-01 RX ORDER — FOLIC ACID 0.8 MG
1 TABLET ORAL
Refills: 0 | DISCHARGE

## 2024-01-01 RX ORDER — ACETAMINOPHEN 500 MG
0 TABLET ORAL
Refills: 0 | DISCHARGE

## 2024-01-01 RX ORDER — LANOLIN ALCOHOL/MO/W.PET/CERES
1 CREAM (GRAM) TOPICAL
Refills: 0 | DISCHARGE

## 2024-01-01 RX ORDER — MAGNESIUM HYDROXIDE 400 MG/1
15 TABLET, CHEWABLE ORAL
Refills: 0 | DISCHARGE

## 2024-01-01 RX ORDER — METOPROLOL TARTRATE 50 MG
1 TABLET ORAL
Refills: 0 | DISCHARGE

## 2024-01-01 RX ORDER — THIAMINE MONONITRATE (VIT B1) 100 MG
1 TABLET ORAL
Refills: 0 | DISCHARGE

## 2024-04-23 NOTE — ED PROVIDER NOTE - CHIEF COMPLAINT
(0) No drift; limb holds 90 (or 45) degrees for full 10 secs The patient is a 68y Male complaining of abnormal lab result.

## 2024-11-25 ENCOUNTER — NON-APPOINTMENT (OUTPATIENT)
Age: 69
End: 2024-11-25

## 2024-11-25 DIAGNOSIS — E87.6 HYPOKALEMIA: ICD-10-CM

## 2024-11-25 DIAGNOSIS — N17.9 ACUTE KIDNEY FAILURE, UNSPECIFIED: ICD-10-CM

## 2024-11-25 DIAGNOSIS — E78.5 HYPERLIPIDEMIA, UNSPECIFIED: ICD-10-CM

## 2024-11-25 DIAGNOSIS — N18.32 CHRONIC KIDNEY DISEASE, STAGE 3B: ICD-10-CM

## 2024-11-25 DIAGNOSIS — I10 ESSENTIAL (PRIMARY) HYPERTENSION: ICD-10-CM

## 2024-11-25 DIAGNOSIS — Z87.891 PERSONAL HISTORY OF NICOTINE DEPENDENCE: ICD-10-CM

## 2024-11-25 DIAGNOSIS — Z82.49 FAMILY HISTORY OF ISCHEMIC HEART DISEASE AND OTHER DISEASES OF THE CIRCULATORY SYSTEM: ICD-10-CM

## 2024-11-25 RX ORDER — POTASSIUM CHLORIDE 1500 MG/1
20 TABLET, EXTENDED RELEASE ORAL 3 TIMES DAILY
Refills: 0 | Status: ACTIVE | COMMUNITY

## 2024-11-25 RX ORDER — ALLOPURINOL 300 MG/1
300 TABLET ORAL DAILY
Refills: 0 | Status: ACTIVE | COMMUNITY

## 2024-11-25 RX ORDER — ATORVASTATIN CALCIUM 20 MG/1
20 TABLET, FILM COATED ORAL DAILY
Refills: 0 | Status: ACTIVE | COMMUNITY

## 2024-11-25 RX ORDER — METOPROLOL SUCCINATE 100 MG/1
100 TABLET, EXTENDED RELEASE ORAL DAILY
Refills: 0 | Status: ACTIVE | COMMUNITY

## 2024-11-25 RX ORDER — LEVOTHYROXINE SODIUM 0.09 MG/1
88 TABLET ORAL DAILY
Refills: 0 | Status: ACTIVE | COMMUNITY

## 2024-11-25 RX ORDER — TORSEMIDE 20 MG/1
20 TABLET ORAL DAILY
Refills: 0 | Status: ACTIVE | COMMUNITY

## 2025-03-04 NOTE — PROGRESS NOTE ADULT - SUBJECTIVE AND OBJECTIVE BOX
Stable exam  More exercise  Titrate upwards zepbound  See me 3 months  Increase protein diet  Reduce carbs and fats   BHAVIK SCOTT  MRN-[id number]  Patient is a 68y old  Male who presents with a chief complaint of S/P fall (16 Sep 2023 09:59)    HPI:  Pt is a 67 y/o M pmhx of HTN, hypothyroid, ETOH cirrhosis and CKD who presents from nursing home for AMS and s/p fall. Pt was transported to [facility] by EMS for concern for rhabdomyolysis, in ED pt found to be hypothermic, acidotic and hyperkalemic, w/ serum Cr of 5.05. Pt found to be in sinus bradycardia w/ HR in the 40's, concern for junctional rhythm, nephrology and MICU consulted for further management (15 Sep 2023 21:16)      Surgery/Hospital course:  9/15: cvv-hd  Today: cvv-hd, still on 3 pressors, no further junctional rhythm    REVIEW OF SYSTEMS:    Gen: No fever  unable to obtain ros given mentation    Physical Exam:  Vital Signs Last 24 Hrs  T(C): 36.8 (16 Sep 2023 15:51), Max: 37.1 (16 Sep 2023 06:30)  T(F): 98.2 (16 Sep 2023 15:51), Max: 98.8 (16 Sep 2023 06:30)  HR: 84 (16 Sep 2023 17:30) (68 - 110)  BP: 108/89 (16 Sep 2023 01:15) (84/39 - 112/60)  BP(mean): 96 (16 Sep 2023 01:15) (53 - 96)  RR: 14 (16 Sep 2023 17:30) (7 - 26)  SpO2: 98% (16 Sep 2023 17:30) (95% - 100%)    Parameters below as of 16 Sep 2023 16:00  Patient On (Oxygen Delivery Method): nasal cannula  O2 Flow (L/min): 3      Gen:  Awake, alert   CNS: non focal 	  Neck: no JVD  RES : clear , no wheezing                      CVS: Regular  rhythm. Normal S1/S2  Abd: Soft, non-distended. Bowel sounds present.  Skin: No rash.  Ext:  no edema    ============================I/O===========================   I&O's Detail    15 Sep 2023 07:01  -  16 Sep 2023 07:00  --------------------------------------------------------  IN:    DOPamine Infusion: 106.8 mL    IV PiggyBack: 125 mL    Norepinephrine: 354 mL    Norepinephrine: 39 mL    Sodium Bicarbonate: 500 mL    Vasopressin: 42 mL  Total IN: 1166.8 mL    OUT:    EPINEPHrine: 0 mL    Indwelling Catheter - Urethral (mL): 0 mL    Other (mL): 499 mL  Total OUT: 499 mL    Total NET: 667.8 mL      16 Sep 2023 07:01  -  16 Sep 2023 17:35  --------------------------------------------------------  IN:    DOPamine Infusion: 71.2 mL    IV PiggyBack: 125 mL    Norepinephrine: 309.8 mL    Sodium Bicarbonate: 300 mL    Vasopressin: 48 mL  Total IN: 854 mL    OUT:    Indwelling Catheter - Urethral (mL): 10 mL    Other (mL): 968 mL  Total OUT: 978 mL    Total NET: -124 mL        ============================ LABS =========================                        8.3    9.56  )-----------( 123      ( 16 Sep 2023 08:15 )             23.4     09-16    132<L>  |  98  |  43.9<H>  ----------------------------<  99  4.2   |  18.0<L>  |  3.78<H>    Ca    7.8<L>      16 Sep 2023 14:25  Phos  5.0     09-16  Mg     2.2     09-16    TPro  6.2<L>  /  Alb  2.1<L>  /  TBili  3.4<H>  /  DBili  x   /  AST  62<H>  /  ALT  29  /  AlkPhos  86  09-16    LIVER FUNCTIONS - ( 16 Sep 2023 14:25 )  Alb: 2.1 g/dL / Pro: 6.2 g/dL / ALK PHOS: 86 U/L / ALT: 29 U/L / AST: 62 U/L / GGT: x           PT/INR - ( 15 Sep 2023 16:25 )   PT: 19.3 sec;   INR: 1.77 ratio         PTT - ( 15 Sep 2023 16:25 )  PTT:41.1 sec  ABG - ( 16 Sep 2023 14:01 )  pH, Arterial: 7.470 pH, Blood: x     /  pCO2: 27    /  pO2: 122   / HCO3: 20    / Base Excess: -4.0  /  SaO2: 100.0             Urinalysis Basic - ( 16 Sep 2023 14:25 )    Color: x / Appearance: x / SG: x / pH: x  Gluc: 99 mg/dL / Ketone: x  / Bili: x / Urobili: x   Blood: x / Protein: x / Nitrite: x   Leuk Esterase: x / RBC: x / WBC x   Sq Epi: x / Non Sq Epi: x / Bacteria: x      ======================Micro/Rad/Cardio=================  Culture: sent  CXR: Reviewed  Echo:Reviewed   BHAVIK SCOTT  MRN-[id number]  Patient is a 68y old  Male who presents with a chief complaint of S/P fall (16 Sep 2023 09:59)    HPI:  Pt is a 69 y/o M pmhx of HTN, hypothyroid, ETOH cirrhosis and CKD who presents from nursing home for AMS and s/p fall. Pt was transported to [facility] by EMS for concern for rhabdomyolysis, in ED pt found to be hypothermic, acidotic and hyperkalemic, w/ serum Cr of 5.05. Pt found to be in sinus bradycardia w/ HR in the 40's, concern for junctional rhythm, nephrology and MICU consulted for further management (15 Sep 2023 21:16)      Surgery/Hospital course:  9/15: cvv-hd  Today: cvv-hd, still on 3 pressors, no further junctional rhythm    REVIEW OF SYSTEMS:    Gen: No fever  unable to obtain ros given mentation    Physical Exam:  Vital Signs Last 24 Hrs  T(C): 36.8 (16 Sep 2023 15:51), Max: 37.1 (16 Sep 2023 06:30)  T(F): 98.2 (16 Sep 2023 15:51), Max: 98.8 (16 Sep 2023 06:30)  HR: 84 (16 Sep 2023 17:30) (68 - 110)  BP: 108/89 (16 Sep 2023 01:15) (84/39 - 112/60)  BP(mean): 96 (16 Sep 2023 01:15) (53 - 96)  RR: 14 (16 Sep 2023 17:30) (7 - 26)  SpO2: 98% (16 Sep 2023 17:30) (95% - 100%)    Parameters below as of 16 Sep 2023 16:00  Patient On (Oxygen Delivery Method): nasal cannula  O2 Flow (L/min): 3      Gen:  Awake, alert   CNS: non focal 	  Neck: no JVD  RES : clear , no wheezing                      CVS: Regular  rhythm. Normal S1/S2  Abd: Soft, non-distended. Bowel sounds present.  Skin: No rash.  Ext:  no edema    ============================I/O===========================   I&O's Detail    15 Sep 2023 07:01  -  16 Sep 2023 07:00  --------------------------------------------------------  IN:    DOPamine Infusion: 106.8 mL    IV PiggyBack: 125 mL    Norepinephrine: 354 mL    Norepinephrine: 39 mL    Sodium Bicarbonate: 500 mL    Vasopressin: 42 mL  Total IN: 1166.8 mL    OUT:    EPINEPHrine: 0 mL    Indwelling Catheter - Urethral (mL): 0 mL    Other (mL): 499 mL  Total OUT: 499 mL    Total NET: 667.8 mL      16 Sep 2023 07:01  -  16 Sep 2023 17:35  --------------------------------------------------------  IN:    DOPamine Infusion: 71.2 mL    IV PiggyBack: 125 mL    Norepinephrine: 309.8 mL    Sodium Bicarbonate: 300 mL    Vasopressin: 48 mL  Total IN: 854 mL    OUT:    Indwelling Catheter - Urethral (mL): 10 mL    Other (mL): 968 mL  Total OUT: 978 mL    Total NET: -124 mL        ============================ LABS =========================                        8.3    9.56  )-----------( 123      ( 16 Sep 2023 08:15 )             23.4     09-16    132<L>  |  98  |  43.9<H>  ----------------------------<  99  4.2   |  18.0<L>  |  3.78<H>    Ca    7.8<L>      16 Sep 2023 14:25  Phos  5.0     09-16  Mg     2.2     09-16    TPro  6.2<L>  /  Alb  2.1<L>  /  TBili  3.4<H>  /  DBili  x   /  AST  62<H>  /  ALT  29  /  AlkPhos  86  09-16    LIVER FUNCTIONS - ( 16 Sep 2023 14:25 )  Alb: 2.1 g/dL / Pro: 6.2 g/dL / ALK PHOS: 86 U/L / ALT: 29 U/L / AST: 62 U/L / GGT: x           PT/INR - ( 15 Sep 2023 16:25 )   PT: 19.3 sec;   INR: 1.77 ratio         PTT - ( 15 Sep 2023 16:25 )  PTT:41.1 sec  ABG - ( 16 Sep 2023 14:01 )  pH, Arterial: 7.470 pH, Blood: x     /  pCO2: 27    /  pO2: 122   / HCO3: 20    / Base Excess: -4.0  /  SaO2: 100.0             Urinalysis Basic - ( 16 Sep 2023 14:25 )    Color: x / Appearance: x / SG: x / pH: x  Gluc: 99 mg/dL / Ketone: x  / Bili: x / Urobili: x   Blood: x / Protein: x / Nitrite: x   Leuk Esterase: x / RBC: x / WBC x   Sq Epi: x / Non Sq Epi: x / Bacteria: x      ======================Micro/Rad/Cardio=================  Culture: sent  CXR: Reviewed  Echo:Reviewed   BHAVIK SCOTT  MRN-[id number]  Patient is a 68y old  Male who presents with a chief complaint of S/P fall (16 Sep 2023 09:59)    HPI:  Pt is a 69 y/o M pmhx of HTN, hypothyroid, ETOH cirrhosis and CKD who presents from nursing home for AMS and s/p fall. Pt was transported to [facility] by EMS for concern for rhabdomyolysis, in ED pt found to be hypothermic, acidotic and hyperkalemic, w/ serum Cr of 5.05. Pt found to be in sinus bradycardia w/ HR in the 40's, concern for junctional rhythm, nephrology and MICU consulted for further management (15 Sep 2023 21:16)      Surgery/Hospital course:  9/15: cvv-hd  Today: cvv-hd, still on 3 pressors, no further junctional rhythm    REVIEW OF SYSTEMS:    Gen: No fever  unable to obtain ros given mentation    Physical Exam:  Vital Signs Last 24 Hrs  T(C): 36.8 (16 Sep 2023 15:51), Max: 37.1 (16 Sep 2023 06:30)  T(F): 98.2 (16 Sep 2023 15:51), Max: 98.8 (16 Sep 2023 06:30)  HR: 84 (16 Sep 2023 17:30) (68 - 110)  BP: 108/89 (16 Sep 2023 01:15) (84/39 - 112/60)  BP(mean): 96 (16 Sep 2023 01:15) (53 - 96)  RR: 14 (16 Sep 2023 17:30) (7 - 26)  SpO2: 98% (16 Sep 2023 17:30) (95% - 100%)    Parameters below as of 16 Sep 2023 16:00  Patient On (Oxygen Delivery Method): nasal cannula  O2 Flow (L/min): 3      Gen:  Awake, alert   CNS: non focal 	  Neck: no JVD  RES : clear , no wheezing                      CVS: Regular  rhythm. Normal S1/S2  Abd: Soft, non-distended. Bowel sounds present.  Skin: No rash.  Ext:  no edema    ============================I/O===========================   I&O's Detail    15 Sep 2023 07:01  -  16 Sep 2023 07:00  --------------------------------------------------------  IN:    DOPamine Infusion: 106.8 mL    IV PiggyBack: 125 mL    Norepinephrine: 354 mL    Norepinephrine: 39 mL    Sodium Bicarbonate: 500 mL    Vasopressin: 42 mL  Total IN: 1166.8 mL    OUT:    EPINEPHrine: 0 mL    Indwelling Catheter - Urethral (mL): 0 mL    Other (mL): 499 mL  Total OUT: 499 mL    Total NET: 667.8 mL      16 Sep 2023 07:01  -  16 Sep 2023 17:35  --------------------------------------------------------  IN:    DOPamine Infusion: 71.2 mL    IV PiggyBack: 125 mL    Norepinephrine: 309.8 mL    Sodium Bicarbonate: 300 mL    Vasopressin: 48 mL  Total IN: 854 mL    OUT:    Indwelling Catheter - Urethral (mL): 10 mL    Other (mL): 968 mL  Total OUT: 978 mL    Total NET: -124 mL        ============================ LABS =========================                        8.3    9.56  )-----------( 123      ( 16 Sep 2023 08:15 )             23.4     09-16    132<L>  |  98  |  43.9<H>  ----------------------------<  99  4.2   |  18.0<L>  |  3.78<H>    Ca    7.8<L>      16 Sep 2023 14:25  Phos  5.0     09-16  Mg     2.2     09-16    TPro  6.2<L>  /  Alb  2.1<L>  /  TBili  3.4<H>  /  DBili  x   /  AST  62<H>  /  ALT  29  /  AlkPhos  86  09-16    LIVER FUNCTIONS - ( 16 Sep 2023 14:25 )  Alb: 2.1 g/dL / Pro: 6.2 g/dL / ALK PHOS: 86 U/L / ALT: 29 U/L / AST: 62 U/L / GGT: x           PT/INR - ( 15 Sep 2023 16:25 )   PT: 19.3 sec;   INR: 1.77 ratio         PTT - ( 15 Sep 2023 16:25 )  PTT:41.1 sec  ABG - ( 16 Sep 2023 14:01 )  pH, Arterial: 7.470 pH, Blood: x     /  pCO2: 27    /  pO2: 122   / HCO3: 20    / Base Excess: -4.0  /  SaO2: 100.0             Urinalysis Basic - ( 16 Sep 2023 14:25 )    Color: x / Appearance: x / SG: x / pH: x  Gluc: 99 mg/dL / Ketone: x  / Bili: x / Urobili: x   Blood: x / Protein: x / Nitrite: x   Leuk Esterase: x / RBC: x / WBC x   Sq Epi: x / Non Sq Epi: x / Bacteria: x      ======================Micro/Rad/Cardio=================  Culture: sent  CXR: Reviewed  Echo:Reviewed